# Patient Record
Sex: FEMALE | Race: BLACK OR AFRICAN AMERICAN | NOT HISPANIC OR LATINO | Employment: UNEMPLOYED | URBAN - METROPOLITAN AREA
[De-identification: names, ages, dates, MRNs, and addresses within clinical notes are randomized per-mention and may not be internally consistent; named-entity substitution may affect disease eponyms.]

---
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
## 2018-08-25 ENCOUNTER — HISTORICAL (OUTPATIENT)
Dept: LAB | Facility: HOSPITAL | Age: 18
End: 2018-08-25

## 2018-08-25 LAB
ABS NEUT (OLG): 3.23 X10(3)/MCL (ref 2.1–9.2)
BASOPHILS # BLD AUTO: 0.02 X10(3)/MCL (ref 0–0.2)
BASOPHILS NFR BLD AUTO: 0.4 % (ref 0–1)
EOSINOPHIL # BLD AUTO: 0.05 X10(3)/MCL (ref 0–0.9)
EOSINOPHIL NFR BLD AUTO: 0.9 % (ref 0–6.4)
ERYTHROCYTE [DISTWIDTH] IN BLOOD BY AUTOMATED COUNT: 14.4 % (ref 11.5–17)
GROUP & RH: NORMAL
HBV SURFACE AG SERPL QL IA: NEGATIVE
HCT VFR BLD AUTO: 32.8 % (ref 37–47)
HGB BLD-MCNC: 11 GM/DL (ref 12–16)
HIV 1+2 AB+HIV1 P24 AG SERPL QL IA: NEGATIVE
IMM GRANULOCYTES # BLD AUTO: 0.01 10*3/UL (ref 0–0.02)
IMM GRANULOCYTES NFR BLD AUTO: 0.2 % (ref 0–0.43)
LYMPHOCYTES # BLD AUTO: 1.96 X10(3)/MCL (ref 0.6–4.6)
LYMPHOCYTES NFR BLD AUTO: 35.2 % (ref 16–44)
MCH RBC QN AUTO: 26.9 PG (ref 27–31)
MCHC RBC AUTO-ENTMCNC: 33.5 GM/DL (ref 33–36)
MCV RBC AUTO: 80.2 FL (ref 80–94)
MONOCYTES # BLD AUTO: 0.3 X10(3)/MCL (ref 0.1–1.3)
MONOCYTES NFR BLD AUTO: 5.4 % (ref 4–12.1)
NEUTROPHILS # BLD AUTO: 3.23 X10(3)/MCL (ref 2.1–9.2)
NEUTROPHILS NFR BLD AUTO: 57.9 % (ref 43–73)
NRBC BLD AUTO-RTO: 0 % (ref 0–0.2)
PLATELET # BLD AUTO: 370 X10(3)/MCL (ref 130–400)
PMV BLD AUTO: 8.9 FL (ref 7.4–10.4)
RBC # BLD AUTO: 4.09 X10(6)/MCL (ref 4.2–5.4)
RPR SER QL: NORMAL
TSH SERPL-ACNC: 1.07 MIU/ML (ref 0.36–3.74)
WBC # SPEC AUTO: 5.6 X10(3)/MCL (ref 4.5–11.5)

## 2018-08-27 LAB — FINAL CULTURE: NO GROWTH

## 2018-11-05 ENCOUNTER — HISTORICAL (OUTPATIENT)
Dept: LAB | Facility: HOSPITAL | Age: 18
End: 2018-11-05

## 2018-12-21 ENCOUNTER — HOSPITAL ENCOUNTER (OUTPATIENT)
Dept: OBSTETRICS AND GYNECOLOGY | Facility: HOSPITAL | Age: 18
End: 2018-12-21
Attending: OBSTETRICS & GYNECOLOGY | Admitting: OBSTETRICS & GYNECOLOGY

## 2018-12-21 LAB
APPEARANCE, UA: CLEAR
BACTERIA SPEC CULT: ABNORMAL /HPF
BILIRUB UR QL STRIP: NEGATIVE
COLOR UR: YELLOW
GLUCOSE (UA): NEGATIVE
HGB UR QL STRIP: NEGATIVE
KETONES UR QL STRIP: ABNORMAL
LEUKOCYTE ESTERASE UR QL STRIP: ABNORMAL
NITRITE UR QL STRIP: NEGATIVE
PH UR STRIP: 8 [PH] (ref 5–9)
PROT UR QL STRIP: NEGATIVE
RBC #/AREA URNS HPF: ABNORMAL /[HPF]
SP GR UR STRIP: 1.01 (ref 1–1.03)
SQUAMOUS EPITHELIAL, UA: ABNORMAL
UROBILINOGEN UR STRIP-ACNC: 1
WBC #/AREA URNS HPF: 8 /HPF (ref 0–3)

## 2020-02-04 ENCOUNTER — HISTORICAL (OUTPATIENT)
Dept: LAB | Facility: HOSPITAL | Age: 20
End: 2020-02-04

## 2020-02-04 LAB
AT III ACT/NOR PPP CHRO: 98 % (ref 82–139)
INR PPP: 1
PROTHROMBIN TIME: 10.5 SECOND(S) (ref 8.6–10.1)

## 2021-04-12 ENCOUNTER — HISTORICAL (OUTPATIENT)
Dept: ADMINISTRATIVE | Facility: HOSPITAL | Age: 21
End: 2021-04-12

## 2021-04-30 ENCOUNTER — HISTORICAL (OUTPATIENT)
Dept: LAB | Facility: HOSPITAL | Age: 21
End: 2021-04-30

## 2021-04-30 LAB — GLUCOSE 1H P 100 G GLC PO SERPL-MCNC: 71 MG/DL (ref 100–180)

## 2021-05-14 ENCOUNTER — HISTORICAL (OUTPATIENT)
Dept: LAB | Facility: HOSPITAL | Age: 21
End: 2021-05-14

## 2021-05-14 LAB
CREAT SERPL-MCNC: 0.65 MG/DL (ref 0.55–1.02)
CREAT UR-MCNC: 95 MG/DL (ref 47–110)

## 2021-06-03 ENCOUNTER — HISTORICAL (OUTPATIENT)
Dept: LAB | Facility: HOSPITAL | Age: 21
End: 2021-06-03

## 2021-06-03 LAB
CREAT SERPL-MCNC: 0.6 MG/DL (ref 0.55–1.02)
CREAT UR-MCNC: 69 MG/DL (ref 47–110)
PROT 24H UR-MCNC: <176.8 MG/24HR (ref 0–165)

## 2022-12-24 ENCOUNTER — HOSPITAL ENCOUNTER (EMERGENCY)
Facility: HOSPITAL | Age: 22
Discharge: HOME | End: 2022-12-25
Attending: EMERGENCY MEDICINE
Payer: MEDICAID

## 2022-12-24 DIAGNOSIS — F41.9 ANXIETY: Primary | ICD-10-CM

## 2022-12-24 PROCEDURE — 96372 THER/PROPH/DIAG INJ SC/IM: CPT | Performed by: PHYSICIAN ASSISTANT

## 2022-12-24 PROCEDURE — 63600175 PHARM REV CODE 636 W HCPCS: Performed by: PHYSICIAN ASSISTANT

## 2022-12-24 PROCEDURE — 99284 EMERGENCY DEPT VISIT MOD MDM: CPT | Mod: 25

## 2022-12-24 RX ORDER — HYDROXYZINE PAMOATE 25 MG/1
25 CAPSULE ORAL
Status: DISCONTINUED | OUTPATIENT
Start: 2022-12-24 | End: 2022-12-24

## 2022-12-24 RX ORDER — METHOCARBAMOL 100 MG/ML
500 INJECTION, SOLUTION INTRAMUSCULAR; INTRAVENOUS
Status: COMPLETED | OUTPATIENT
Start: 2022-12-24 | End: 2022-12-24

## 2022-12-24 RX ORDER — DIPHENHYDRAMINE HYDROCHLORIDE 50 MG/ML
25 INJECTION INTRAMUSCULAR; INTRAVENOUS
Status: DISCONTINUED | OUTPATIENT
Start: 2022-12-24 | End: 2022-12-24

## 2022-12-24 RX ADMIN — METHOCARBAMOL 500 MG: 100 INJECTION, SOLUTION INTRAMUSCULAR; INTRAVENOUS at 10:12

## 2022-12-25 ENCOUNTER — HOSPITAL ENCOUNTER (EMERGENCY)
Facility: HOSPITAL | Age: 22
Discharge: HOME | End: 2022-12-25
Attending: FAMILY MEDICINE
Payer: MEDICAID

## 2022-12-25 VITALS
TEMPERATURE: 99 F | SYSTOLIC BLOOD PRESSURE: 121 MMHG | BODY MASS INDEX: 29.82 KG/M2 | RESPIRATION RATE: 18 BRPM | WEIGHT: 179 LBS | HEART RATE: 92 BPM | DIASTOLIC BLOOD PRESSURE: 76 MMHG | HEIGHT: 65 IN | OXYGEN SATURATION: 100 %

## 2022-12-25 VITALS
HEIGHT: 65 IN | RESPIRATION RATE: 18 BRPM | WEIGHT: 179 LBS | BODY MASS INDEX: 29.82 KG/M2 | TEMPERATURE: 98 F | SYSTOLIC BLOOD PRESSURE: 124 MMHG | OXYGEN SATURATION: 100 % | HEART RATE: 89 BPM | DIASTOLIC BLOOD PRESSURE: 76 MMHG

## 2022-12-25 DIAGNOSIS — F41.9 ANXIETY: Primary | ICD-10-CM

## 2022-12-25 LAB
ALBUMIN SERPL-MCNC: 3.6 G/DL (ref 3.5–5)
ALBUMIN/GLOB SERPL: 1 RATIO (ref 1.1–2)
ALP SERPL-CCNC: 107 UNIT/L (ref 40–150)
ALT SERPL-CCNC: 7 UNIT/L (ref 0–55)
APPEARANCE UR: CLEAR
AST SERPL-CCNC: 13 UNIT/L (ref 5–34)
BACTERIA #/AREA URNS AUTO: ABNORMAL /HPF
BASOPHILS # BLD AUTO: 0.03 X10(3)/MCL (ref 0–0.2)
BASOPHILS NFR BLD AUTO: 0.6 %
BILIRUB UR QL STRIP.AUTO: NEGATIVE MG/DL
BILIRUBIN DIRECT+TOT PNL SERPL-MCNC: 0.1 MG/DL
BUN SERPL-MCNC: 12.9 MG/DL (ref 7–18.7)
CALCIUM SERPL-MCNC: 8.7 MG/DL (ref 8.4–10.2)
CHLORIDE SERPL-SCNC: 108 MMOL/L (ref 98–107)
CO2 SERPL-SCNC: 21 MMOL/L (ref 22–29)
COLOR UR AUTO: ABNORMAL
CREAT SERPL-MCNC: 0.82 MG/DL (ref 0.55–1.02)
EOSINOPHIL # BLD AUTO: 0.2 X10(3)/MCL (ref 0–0.9)
EOSINOPHIL NFR BLD AUTO: 3.9 %
ERYTHROCYTE [DISTWIDTH] IN BLOOD BY AUTOMATED COUNT: 14.6 % (ref 11–14.5)
GFR SERPLBLD CREATININE-BSD FMLA CKD-EPI: >60 MLS/MIN/1.73/M2
GLOBULIN SER-MCNC: 3.6 GM/DL (ref 2.4–3.5)
GLUCOSE SERPL-MCNC: 89 MG/DL (ref 74–100)
GLUCOSE UR QL STRIP.AUTO: NORMAL MG/DL
HCT VFR BLD AUTO: 33.7 % (ref 37–47)
HGB BLD-MCNC: 10.8 GM/DL (ref 12–16)
HYALINE CASTS #/AREA URNS LPF: ABNORMAL /LPF
IMM GRANULOCYTES # BLD AUTO: 0.01 X10(3)/MCL (ref 0–0.04)
IMM GRANULOCYTES NFR BLD AUTO: 0.2 %
KETONES UR QL STRIP.AUTO: NEGATIVE MG/DL
LEUKOCYTE ESTERASE UR QL STRIP.AUTO: 500 UNIT/L
LYMPHOCYTES # BLD AUTO: 2.61 X10(3)/MCL (ref 0.6–4.6)
LYMPHOCYTES NFR BLD AUTO: 50.9 %
MAGNESIUM SERPL-MCNC: 1.6 MG/DL (ref 1.6–2.6)
MCH RBC QN AUTO: 25.8 PG
MCHC RBC AUTO-ENTMCNC: 32 MG/DL (ref 33–36)
MCV RBC AUTO: 80.6 FL (ref 80–94)
MONOCYTES # BLD AUTO: 0.45 X10(3)/MCL (ref 0.1–1.3)
MONOCYTES NFR BLD AUTO: 8.8 %
MUCOUS THREADS URNS QL MICRO: ABNORMAL /LPF
NEUTROPHILS # BLD AUTO: 1.83 X10(3)/MCL (ref 2.1–9.2)
NEUTROPHILS NFR BLD AUTO: 35.6 %
NITRITE UR QL STRIP.AUTO: NEGATIVE
NRBC BLD AUTO-RTO: 0 % (ref 0–1)
PH UR STRIP.AUTO: 6.5 [PH]
PLATELET # BLD AUTO: 371 X10(3)/MCL (ref 140–371)
PMV BLD AUTO: 9.2 FL (ref 9.4–12.4)
POTASSIUM SERPL-SCNC: 3.9 MMOL/L (ref 3.5–5.1)
PROT SERPL-MCNC: 7.2 GM/DL (ref 6.4–8.3)
PROT UR QL STRIP.AUTO: NEGATIVE MG/DL
RBC # BLD AUTO: 4.18 X10(6)/MCL (ref 4.2–5.4)
RBC #/AREA URNS AUTO: ABNORMAL /HPF
RBC UR QL AUTO: NEGATIVE UNIT/L
SODIUM SERPL-SCNC: 138 MMOL/L (ref 136–145)
SP GR UR STRIP.AUTO: 1.01
SQUAMOUS #/AREA URNS LPF: ABNORMAL /HPF
UROBILINOGEN UR STRIP-ACNC: NORMAL MG/DL
WBC # SPEC AUTO: 5.1 X10(3)/MCL (ref 4.5–11.5)
WBC #/AREA URNS AUTO: ABNORMAL /HPF

## 2022-12-25 PROCEDURE — 99284 EMERGENCY DEPT VISIT MOD MDM: CPT | Mod: 25

## 2022-12-25 PROCEDURE — 96361 HYDRATE IV INFUSION ADD-ON: CPT

## 2022-12-25 PROCEDURE — 63600175 PHARM REV CODE 636 W HCPCS: Performed by: FAMILY MEDICINE

## 2022-12-25 PROCEDURE — 96374 THER/PROPH/DIAG INJ IV PUSH: CPT

## 2022-12-25 PROCEDURE — 80053 COMPREHEN METABOLIC PANEL: CPT | Performed by: FAMILY MEDICINE

## 2022-12-25 PROCEDURE — 87077 CULTURE AEROBIC IDENTIFY: CPT | Performed by: FAMILY MEDICINE

## 2022-12-25 PROCEDURE — 83735 ASSAY OF MAGNESIUM: CPT | Performed by: FAMILY MEDICINE

## 2022-12-25 PROCEDURE — 81001 URINALYSIS AUTO W/SCOPE: CPT | Performed by: FAMILY MEDICINE

## 2022-12-25 PROCEDURE — 25000003 PHARM REV CODE 250: Performed by: FAMILY MEDICINE

## 2022-12-25 PROCEDURE — 85025 COMPLETE CBC W/AUTO DIFF WBC: CPT | Performed by: FAMILY MEDICINE

## 2022-12-25 RX ORDER — SERTRALINE HYDROCHLORIDE 25 MG/1
25 TABLET, FILM COATED ORAL DAILY
COMMUNITY
Start: 2022-10-19

## 2022-12-25 RX ORDER — LORAZEPAM 2 MG/ML
1 INJECTION INTRAMUSCULAR
Status: COMPLETED | OUTPATIENT
Start: 2022-12-25 | End: 2022-12-25

## 2022-12-25 RX ORDER — HYDROXYZINE PAMOATE 25 MG/1
25 CAPSULE ORAL EVERY 6 HOURS PRN
Qty: 40 CAPSULE | Refills: 0 | Status: SHIPPED | OUTPATIENT
Start: 2022-12-25 | End: 2023-01-04

## 2022-12-25 RX ADMIN — SODIUM CHLORIDE 1000 ML: 9 INJECTION, SOLUTION INTRAVENOUS at 08:12

## 2022-12-25 RX ADMIN — LORAZEPAM 1 MG: 2 INJECTION INTRAMUSCULAR; INTRAVENOUS at 08:12

## 2022-12-25 NOTE — DISCHARGE INSTRUCTIONS
Stay well hydrated and drinks lots of water. Return with any concerning symptoms.  Follow up with primary care provider within 3-5 days.

## 2022-12-25 NOTE — Clinical Note
"Demond Aquino" Moise was seen and treated in our emergency department on 12/25/2022.  She may return to work on 12/26/2022.       If you have any questions or concerns, please don't hesitate to call.      DYLAN escobar RN    "

## 2022-12-25 NOTE — ED PROVIDER NOTES
Encounter Date: 12/24/2022       History     Chief Complaint   Patient presents with    Anxiety     Pt reports taking two synthetic THC gas station gummies tonight and is now very anxious, she reports she has never taken any kind of illicit substance before.     Tachycardia     Patient is a 21 year old female who presents to the emergency department feeling anxious and shaking after she took two synthetic THC gummies from a gas station earlier tonight.  She states this is the first time she has taken an illicit substance before.  She denies chest pain, SOB, abdominal pain, nausea, vomiting, dizziness, vision changes.      The history is provided by the patient. No  was used.   Review of patient's allergies indicates:   Allergen Reactions    Amoxicillin Hives     History reviewed. No pertinent past medical history.  History reviewed. No pertinent surgical history.  History reviewed. No pertinent family history.  Social History     Tobacco Use    Smoking status: Never    Smokeless tobacco: Never   Substance Use Topics    Alcohol use: Yes     Comment: twice a month/socially     Review of Systems   Constitutional:  Negative for chills and fever.   HENT: Negative.     Eyes: Negative.    Respiratory:  Negative for cough, chest tightness and shortness of breath.    Cardiovascular:  Negative for chest pain and palpitations.        Fast heart rate   Gastrointestinal:  Negative for abdominal pain, diarrhea, nausea and vomiting.   Musculoskeletal:  Negative for back pain and neck pain.   Skin:  Negative for color change, rash and wound.   Neurological:  Negative for dizziness, syncope, weakness, light-headedness and numbness.   Hematological:  Negative for adenopathy. Does not bruise/bleed easily.   Psychiatric/Behavioral:  The patient is nervous/anxious.      Physical Exam     Initial Vitals [12/24/22 2149]   BP Pulse Resp Temp SpO2   109/61 (!) 130 20 98.4 °F (36.9 °C) 100 %      MAP       --          Physical Exam    Nursing note and vitals reviewed.  Constitutional: She appears well-developed and well-nourished.   Shaking and anxious    HENT:   Head: Normocephalic and atraumatic.   Nose: Nose normal.   Mouth/Throat: Oropharynx is clear and moist.   Eyes: Conjunctivae and EOM are normal. Pupils are equal, round, and reactive to light.   Neck: Neck supple.   Normal range of motion.  Cardiovascular:  Normal rate, normal heart sounds and intact distal pulses.           Pulmonary/Chest: Breath sounds normal. No respiratory distress.   Abdominal: Abdomen is soft. Bowel sounds are normal. There is no abdominal tenderness.   Musculoskeletal:         General: Normal range of motion.      Cervical back: Normal range of motion and neck supple.     Neurological: She is alert and oriented to person, place, and time. She has normal strength. GCS score is 15. GCS eye subscore is 4. GCS verbal subscore is 5. GCS motor subscore is 6.   Skin: Skin is warm. Capillary refill takes less than 2 seconds.   Psychiatric: Her mood appears anxious.       ED Course   Procedures  Labs Reviewed - No data to display       Imaging Results    None          Medications   methocarbamoL injection 500 mg (500 mg Intramuscular Given 12/24/22 2227)     Medical Decision Making:   ED Management:  The patient is resting comfortably and in no acute distress.  She states that her symptoms have improved after treatment in Emergency Department. I personally discussed the importance of not using the substances she consumed tonight and discussed treatment plan.  Gave strict ED precautions, discussed specific conditions for return to the emergency department and importance of follow up with her primary care provider.  Patient voices understanding and agrees to the plan discussed. All patients' questions have been answered at this time.   She has remained hemodynamically stable throughout entire stay in ED and is stable for discharge home.                           Clinical Impression:   Final diagnoses:  [F41.9] Anxiety (Primary)        ED Disposition Condition    Discharge Stable          ED Prescriptions    None       Follow-up Information       Follow up With Specialties Details Why Contact Info    [facility] - Emergency Dept Emergency Medicine  As needed, If symptoms worsen [address] [facility] [id number] [phone]             ELENA Walker  12/25/22 5532

## 2022-12-26 ENCOUNTER — HOSPITAL ENCOUNTER (EMERGENCY)
Facility: HOSPITAL | Age: 22
Discharge: HOME | End: 2022-12-26
Attending: EMERGENCY MEDICINE
Payer: MEDICAID

## 2022-12-26 VITALS
WEIGHT: 179 LBS | RESPIRATION RATE: 18 BRPM | HEIGHT: 65 IN | DIASTOLIC BLOOD PRESSURE: 68 MMHG | SYSTOLIC BLOOD PRESSURE: 124 MMHG | OXYGEN SATURATION: 98 % | BODY MASS INDEX: 29.82 KG/M2 | HEART RATE: 88 BPM

## 2022-12-26 DIAGNOSIS — F41.9 ANXIETY: Primary | ICD-10-CM

## 2022-12-26 LAB
ALBUMIN SERPL-MCNC: 3.9 G/DL (ref 3.5–5)
ALBUMIN/GLOB SERPL: 1.2 RATIO (ref 1.1–2)
ALP SERPL-CCNC: 120 UNIT/L (ref 40–150)
ALT SERPL-CCNC: 9 UNIT/L (ref 0–55)
AMPHET UR QL SCN: NEGATIVE
AST SERPL-CCNC: 15 UNIT/L (ref 5–34)
B-HCG SERPL QL: NEGATIVE
BARBITURATE SCN PRESENT UR: NEGATIVE
BASOPHILS # BLD AUTO: 0.03 X10(3)/MCL (ref 0–0.2)
BASOPHILS NFR BLD AUTO: 0.4 %
BENZODIAZ UR QL SCN: NEGATIVE
BILIRUBIN DIRECT+TOT PNL SERPL-MCNC: 0.3 MG/DL
BUN SERPL-MCNC: 9.8 MG/DL (ref 7–18.7)
CALCIUM SERPL-MCNC: 9.3 MG/DL (ref 8.4–10.2)
CANNABINOIDS UR QL SCN: POSITIVE
CHLORIDE SERPL-SCNC: 108 MMOL/L (ref 98–107)
CK SERPL-CCNC: 199 U/L (ref 29–168)
CO2 SERPL-SCNC: 18 MMOL/L (ref 22–29)
COCAINE UR QL SCN: NEGATIVE
CREAT SERPL-MCNC: 0.77 MG/DL (ref 0.55–1.02)
EOSINOPHIL # BLD AUTO: 0.22 X10(3)/MCL (ref 0–0.9)
EOSINOPHIL NFR BLD AUTO: 2.8 %
ERYTHROCYTE [DISTWIDTH] IN BLOOD BY AUTOMATED COUNT: 14.6 % (ref 11–14.5)
FLUAV AG UPPER RESP QL IA.RAPID: NOT DETECTED
FLUBV AG UPPER RESP QL IA.RAPID: NOT DETECTED
GFR SERPLBLD CREATININE-BSD FMLA CKD-EPI: >60 MLS/MIN/1.73/M2
GLOBULIN SER-MCNC: 3.3 GM/DL (ref 2.4–3.5)
GLUCOSE SERPL-MCNC: 88 MG/DL (ref 74–100)
HCT VFR BLD AUTO: 35 % (ref 37–47)
HGB BLD-MCNC: 11.2 GM/DL (ref 12–16)
IMM GRANULOCYTES # BLD AUTO: 0.01 X10(3)/MCL (ref 0–0.04)
IMM GRANULOCYTES NFR BLD AUTO: 0.1 %
LYMPHOCYTES # BLD AUTO: 3.19 X10(3)/MCL (ref 0.6–4.6)
LYMPHOCYTES NFR BLD AUTO: 41 %
MAGNESIUM SERPL-MCNC: 1.7 MG/DL (ref 1.6–2.6)
MCH RBC QN AUTO: 25.9 PG
MCHC RBC AUTO-ENTMCNC: 32 MG/DL (ref 33–36)
MCV RBC AUTO: 81 FL (ref 80–94)
MDMA UR QL SCN: NEGATIVE
MONOCYTES # BLD AUTO: 0.53 X10(3)/MCL (ref 0.1–1.3)
MONOCYTES NFR BLD AUTO: 6.8 %
NEUTROPHILS # BLD AUTO: 3.8 X10(3)/MCL (ref 2.1–9.2)
NEUTROPHILS NFR BLD AUTO: 48.9 %
NRBC BLD AUTO-RTO: 0 % (ref 0–1)
OPIATES UR QL SCN: NEGATIVE
PCP UR QL: NEGATIVE
PH UR: 7 [PH] (ref 3–11)
PLATELET # BLD AUTO: 390 X10(3)/MCL (ref 140–371)
PMV BLD AUTO: 9.3 FL (ref 9.4–12.4)
POTASSIUM SERPL-SCNC: 3.8 MMOL/L (ref 3.5–5.1)
PROT SERPL-MCNC: 7.2 GM/DL (ref 6.4–8.3)
RBC # BLD AUTO: 4.32 X10(6)/MCL (ref 4.2–5.4)
SARS-COV-2 RNA RESP QL NAA+PROBE: NOT DETECTED
SODIUM SERPL-SCNC: 139 MMOL/L (ref 136–145)
TROPONIN I SERPL-MCNC: <0.01 NG/ML (ref 0–0.04)
WBC # SPEC AUTO: 7.8 X10(3)/MCL (ref 4.5–11.5)

## 2022-12-26 PROCEDURE — 99284 EMERGENCY DEPT VISIT MOD MDM: CPT | Mod: 25

## 2022-12-26 PROCEDURE — 63600175 PHARM REV CODE 636 W HCPCS: Performed by: EMERGENCY MEDICINE

## 2022-12-26 PROCEDURE — 84484 ASSAY OF TROPONIN QUANT: CPT | Performed by: EMERGENCY MEDICINE

## 2022-12-26 PROCEDURE — 0240U COVID/FLU A&B PCR: CPT | Performed by: EMERGENCY MEDICINE

## 2022-12-26 PROCEDURE — 80053 COMPREHEN METABOLIC PANEL: CPT | Performed by: EMERGENCY MEDICINE

## 2022-12-26 PROCEDURE — 82550 ASSAY OF CK (CPK): CPT | Performed by: EMERGENCY MEDICINE

## 2022-12-26 PROCEDURE — 25000003 PHARM REV CODE 250: Performed by: EMERGENCY MEDICINE

## 2022-12-26 PROCEDURE — 80307 DRUG TEST PRSMV CHEM ANLYZR: CPT | Performed by: EMERGENCY MEDICINE

## 2022-12-26 PROCEDURE — 85025 COMPLETE CBC W/AUTO DIFF WBC: CPT | Performed by: EMERGENCY MEDICINE

## 2022-12-26 PROCEDURE — 96372 THER/PROPH/DIAG INJ SC/IM: CPT | Mod: 59 | Performed by: EMERGENCY MEDICINE

## 2022-12-26 PROCEDURE — 81025 URINE PREGNANCY TEST: CPT | Performed by: EMERGENCY MEDICINE

## 2022-12-26 PROCEDURE — 96360 HYDRATION IV INFUSION INIT: CPT

## 2022-12-26 PROCEDURE — 83735 ASSAY OF MAGNESIUM: CPT | Performed by: EMERGENCY MEDICINE

## 2022-12-26 RX ORDER — LORAZEPAM 1 MG/1
0.5 TABLET ORAL EVERY 6 HOURS PRN
Qty: 10 TABLET | Refills: 0 | Status: SHIPPED | OUTPATIENT
Start: 2022-12-26 | End: 2023-01-25

## 2022-12-26 RX ORDER — HALOPERIDOL 5 MG/ML
2.5 INJECTION INTRAMUSCULAR
Status: COMPLETED | OUTPATIENT
Start: 2022-12-26 | End: 2022-12-26

## 2022-12-26 RX ORDER — LORAZEPAM 1 MG/1
2 TABLET ORAL
Status: COMPLETED | OUTPATIENT
Start: 2022-12-26 | End: 2022-12-26

## 2022-12-26 RX ORDER — SODIUM CHLORIDE 9 MG/ML
1000 INJECTION, SOLUTION INTRAVENOUS
Status: COMPLETED | OUTPATIENT
Start: 2022-12-26 | End: 2022-12-26

## 2022-12-26 RX ADMIN — SODIUM CHLORIDE 1000 ML: 9 INJECTION, SOLUTION INTRAVENOUS at 03:12

## 2022-12-26 RX ADMIN — HALOPERIDOL LACTATE 2.5 MG: 5 INJECTION, SOLUTION INTRAMUSCULAR at 02:12

## 2022-12-26 RX ADMIN — LORAZEPAM 2 MG: 1 TABLET ORAL at 02:12

## 2022-12-26 NOTE — ED PROVIDER NOTES
Encounter Date: 12/25/2022       History     Chief Complaint   Patient presents with    Panic Attack     Pt reports having a panic attack starting prior to arrival, she reports she had one last night, was seen here, improved and then had another one this evening. Uncertain of causation. Reports taking her prescribed dose of zoloft  60mg around 1500 hrs today. Vss.      21-year-old female presents emergency room with complaints of acute anxiety.  Patient reports being seen in the emergency room yesterday with anxiety after reportedly eating a marijuana gummy.  Patient reports feeling fine this morning, but began having symptoms approximately 1 hour ago (7:00 p.m.) where she began shaking uncontrollably.  Denies shortness of breath or chest pain.  Symptoms moderate, nothing makes better or worse.    The history is provided by the patient.   Review of patient's allergies indicates:   Allergen Reactions    Amoxicillin Hives     History reviewed. No pertinent past medical history.  History reviewed. No pertinent surgical history.  History reviewed. No pertinent family history.  Social History     Tobacco Use    Smoking status: Never    Smokeless tobacco: Never   Substance Use Topics    Alcohol use: Yes     Comment: twice a month/socially     Review of Systems   Constitutional:  Negative for chills, fatigue and fever.   HENT:  Negative for ear pain, rhinorrhea and sore throat.    Eyes:  Negative for photophobia and pain.   Respiratory:  Negative for cough, shortness of breath and wheezing.    Cardiovascular:  Negative for chest pain.   Gastrointestinal:  Negative for abdominal pain, diarrhea, nausea and vomiting.   Genitourinary:  Negative for dysuria.   Neurological:  Negative for dizziness, weakness and headaches.   All other systems reviewed and are negative.    Physical Exam     Initial Vitals [12/25/22 2013]   BP Pulse Resp Temp SpO2   123/70 105 19 99 °F (37.2 °C) 99 %      MAP       --         Physical  Exam    Nursing note and vitals reviewed.  Constitutional: She appears well-developed and well-nourished. No distress.   HENT:   Head: Normocephalic and atraumatic.   Eyes: Conjunctivae and EOM are normal. Pupils are equal, round, and reactive to light.   Neck: Neck supple.   Normal range of motion.  Cardiovascular:  Normal rate, regular rhythm, normal heart sounds and intact distal pulses.           Pulmonary/Chest: Breath sounds normal. No respiratory distress. She has no wheezes. She has no rhonchi. She has no rales.   Abdominal: Abdomen is soft. Bowel sounds are normal. She exhibits no distension. There is no abdominal tenderness. There is no rebound and no guarding.   Musculoskeletal:         General: Normal range of motion.      Cervical back: Normal range of motion and neck supple.      Comments: Patient with generalized tremors of upper and lower extremities.  No muscle rigidity appreciated.  No hyperreflexia.     Neurological: She is alert and oriented to person, place, and time. GCS score is 15. GCS eye subscore is 4. GCS verbal subscore is 5. GCS motor subscore is 6.   Skin: Skin is warm and dry. Capillary refill takes less than 2 seconds. No erythema.   Psychiatric: She has a normal mood and affect. Her behavior is normal. Judgment and thought content normal.       ED Course   Procedures  Labs Reviewed   COMPREHENSIVE METABOLIC PANEL - Abnormal; Notable for the following components:       Result Value    Chloride 108 (*)     Carbon Dioxide 21 (*)     Globulin 3.6 (*)     Albumin/Globulin Ratio 1.0 (*)     All other components within normal limits   CBC WITH DIFFERENTIAL - Abnormal; Notable for the following components:    RBC 4.18 (*)     Hgb 10.8 (*)     Hct 33.7 (*)     MCHC 32.0 (*)     RDW 14.6 (*)     MPV 9.2 (*)     Neut # 1.83 (*)     All other components within normal limits   MAGNESIUM - Normal   CBC W/ AUTO DIFFERENTIAL    Narrative:     The following orders were created for panel order CBC  Auto Differential.  Procedure                               Abnormality         Status                     ---------                               -----------         ------                     CBC with Differential[[id number]]        Abnormal            Final result                 Please view results for these tests on the individual orders.   URINALYSIS, REFLEX TO URINE CULTURE   EXTRA TUBES    Narrative:     The following orders were created for panel order EXTRA TUBES.  Procedure                               Abnormality         Status                     ---------                               -----------         ------                     Light Blue Top Hold[[id number]]                              In process                 Red Top Hold[[id number]]                                     In process                 Light Green Top Hold[[id number]]                             In process                 Lavender Top Hold[[id number]]                                In process                   Please view results for these tests on the individual orders.   LIGHT BLUE TOP HOLD   RED TOP HOLD   LIGHT GREEN TOP HOLD   LAVENDER TOP HOLD          Imaging Results    None          Medications   sodium chloride 0.9% bolus 1,000 mL 1,000 mL (0 mLs Intravenous Stopped 12/25/22 2138)   LORazepam injection 1 mg (1 mg Intravenous Given 12/25/22 2038)     Medical Decision Making:   Differential Diagnosis:   Electrolyte abnormality, serotonin syndrome, generalized anxiety           ED Course as of 12/25/22 2140   Sun Dec 25, 2022   2057 WBC: 5.1 [MW]   2057 HEMOGLOBIN(!): 10.8 [MW]   2057 HEMATOCRIT(!): 33.7 [MW]   2057 Platelets: 371 [MW]   2135 Sodium: 138 [MW]   2135 Potassium: 3.9 [MW]   2136 Chloride(!): 108 [MW]   2136 CO2(!): 21 [MW]   2136 Glucose: 89 [MW]   2136 BUN: 12.9 [MW]   2136 Creatinine: 0.82 [MW]   2136 Calcium: 8.7 [MW]   2136 PROTEIN TOTAL: 7.2 [MW]   2136 Albumin: 3.6 [MW]   2139 Feeling improved.  Stable for  discharge home.  ER precautions given for any acute worsening. [MW]      ED Course User Index  [MW] Blair Medina MD                 Clinical Impression:   Final diagnoses:  [F41.9] Anxiety (Primary)        ED Disposition Condition    Discharge Stable          ED Prescriptions       Medication Sig Dispense Start Date End Date Auth. Provider    hydrOXYzine pamoate (VISTARIL) 25 MG Cap Take 1 capsule (25 mg total) by mouth every 6 (six) hours as needed (Anxiety). 40 capsule 12/25/2022 1/4/2023 Blair Medina MD          Follow-up Information       Follow up With Specialties Details Why Contact Info    [facility] - Emergency Dept Emergency Medicine  As needed, If symptoms worsen [address] [facility] [address] [phone]    Primary Care Physician  In 5 days               Blair Medina MD  12/25/22 4242

## 2022-12-26 NOTE — Clinical Note
HARLEY BAILEY accompanied their spouse to the emergency department on 12/26/2022. They may return to work on 12/28/2022.      If you have any questions or concerns, please don't hesitate to call.       RN

## 2022-12-26 NOTE — Clinical Note
"Demond Mcintyrena Phipps was seen and treated in our emergency department on 12/26/2022.  She may return to work on 12/27/2022.       If you have any questions or concerns, please don't hesitate to call.      Lexis Campo MD"

## 2022-12-27 NOTE — ED PROVIDER NOTES
Encounter Date: 12/26/2022       History     Chief Complaint   Patient presents with    Anxiety      REPORTS WIFE HAVING AN ANXIETY ATTACK; PT LYING ON STRETCHER ANSWERING QUESTIONS AND SHAKING; DENIES SI; DENEIS HI; REPORTS BEING SEEN AT [facility] EARLIER TODAY AND DISCAHRGED; REPORTS BEING SEEN AT [facility] TWICE IN 2 DAYS;      Patient is a 22 yo F presenting with body shaking, anxiety, and cramping in her muscles. Earlier today symptoms started. This has happened before over the past two days after taking a cannabis gummy. Was seen at two ED, had resolution of symptoms but then started again prior to arrival. No syncope. No chest pain. No shortness of breath. She is having body aches from the tensing, shaking, and body cramps. Has a hx of panic attacks but typically aren't this severe. No fevers. No one else having symptoms at home. She denies any HI/SI.      Review of patient's allergies indicates:   Allergen Reactions    Amoxicillin Hives     No past medical history on file.  No past surgical history on file.  No family history on file.  Social History     Tobacco Use    Smoking status: Never    Smokeless tobacco: Never   Substance Use Topics    Alcohol use: Yes     Comment: twice a month/socially     Review of Systems   Constitutional:  Positive for chills and fatigue. Negative for fever.   HENT:  Negative for sore throat.    Respiratory:  Negative for shortness of breath.    Cardiovascular:  Negative for chest pain.   Gastrointestinal:  Negative for nausea.   Genitourinary:  Negative for dysuria.   Musculoskeletal:  Negative for back pain.   Skin:  Negative for rash.   Neurological:  Positive for tremors. Negative for weakness and numbness.   Hematological:  Does not bruise/bleed easily.   Psychiatric/Behavioral:  Positive for agitation. Negative for confusion, hallucinations and suicidal ideas. The patient is nervous/anxious.      Physical Exam     Initial Vitals [12/26/22 0109]   BP Pulse Resp Temp SpO2    122/82 103 20 -- 100 %      MAP       --         Physical Exam    Nursing note and vitals reviewed.  Constitutional: She appears well-developed and well-nourished. No distress.   HENT:   Head: Normocephalic and atraumatic.   Eyes: Conjunctivae are normal. Pupils are equal, round, and reactive to light.   Neck: Neck supple.   Cardiovascular:  Normal rate, regular rhythm and normal heart sounds.           No murmur heard.  Pulmonary/Chest: Breath sounds normal. No respiratory distress. She has no wheezes. She has no rhonchi.   Abdominal: Abdomen is soft. Bowel sounds are normal. She exhibits no distension. There is no abdominal tenderness. There is no rebound and no guarding.   Musculoskeletal:         General: No edema. Normal range of motion.      Cervical back: Neck supple.     Neurological: She is alert and oriented to person, place, and time.   Skin: Skin is warm and dry.   Psychiatric: Thought content normal.   Patient appears to be having a panic attack. She is hyperventilating and having full body trembling and tensing. She is awake and alert. No seizure like activity.       ED Course   Procedures  Labs Reviewed   COMPREHENSIVE METABOLIC PANEL - Abnormal; Notable for the following components:       Result Value    Chloride 108 (*)     Carbon Dioxide 18 (*)     All other components within normal limits   DRUG SCREEN, URINE (BEAKER) - Abnormal; Notable for the following components:    Cannabinoids, Urine Positive (*)     All other components within normal limits    Narrative:     Cut off concentrations:    Amphetamines - 1000 ng/ml  Barbiturates - 200 ng/ml  Benzodiazepine - 200 ng/ml  Cannabinoids (THC) - 50 ng/ml  Cocaine - 300 ng/ml  Fentanyl - 1.0 ng/ml  MDMA - 500 ng/ml  Opiates - 300 ng/ml   Phencyclidine (PCP) - 25 ng/ml    Specimen submitted for drug analysis and tested for pH and specific gravity in order to evaluate sample integrity. Suspect tampering if specific gravity is <1.003 and/or pH is not  within the range of 4.5 - 8.0  False negatives may result form substances such as bleach added to urine.  False positives may result for the presence of a substance with similar chemical structure to the drug or its metabolite.    This test provides only a PRELIMINARY analytical test result. A more specific alternate chemical method must be used in order to obtain a confirmed analytical result. Gas chromatography/mass spectrometry (GC/MS) is the preferred confirmatory method. Other chemical confirmation methods are available. Clinical consideration and professional judgement should be applied to any drug of abuse test result, particularly when preliminary positive results are used.    Positive results will be confirmed only at the physicians request. Unconfirmed screening results are to be used only for medical purposes (treatment).        CK - Abnormal; Notable for the following components:    Creatine Kinase 199 (*)     All other components within normal limits   CBC WITH DIFFERENTIAL - Abnormal; Notable for the following components:    Hgb 11.2 (*)     Hct 35.0 (*)     MCHC 32.0 (*)     RDW 14.6 (*)     Platelet 390 (*)     MPV 9.3 (*)     All other components within normal limits   MAGNESIUM - Normal   PREGNANCY TEST, URINE RAPID - Normal   TROPONIN I - Normal   COVID/FLU A&B PCR - Normal    Narrative:     The Xpert Xpress SARS-CoV-2/FLU/RSV plus is a rapid, multiplexed real-time PCR test intended for the simultaneous qualitative detection and differentiation of SARS-CoV-2, Influenza A, Influenza B, and respiratory syncytial virus (RSV) viral RNA in either nasopharyngeal swab or nasal swab specimens.         CBC W/ AUTO DIFFERENTIAL    Narrative:     The following orders were created for panel order CBC auto differential.  Procedure                               Abnormality         Status                     ---------                               -----------         ------                     CBC with  Differential[[id number]]        Abnormal            Final result                 Please view results for these tests on the individual orders.          Imaging Results    None          Medications   LORazepam tablet 2 mg (2 mg Oral Given 12/26/22 0214)   haloperidol lactate injection 2.5 mg (2.5 mg Intramuscular Given 12/26/22 0252)   0.9%  NaCl infusion (0 mLs Intravenous Stopped 12/26/22 0359)                 ED Course as of 12/27/22 0648   Mon Dec 26, 2022   0339 Re-evaluate after fluids [GM]   0354 Patient feeling better, resting comfortably [GM]      ED Course User Index  [GM] Lexis Campo MD                 Clinical Impression:   Final diagnoses:  [F41.9] Anxiety (Primary)        ED Disposition Condition    Discharge Stable          ED Prescriptions       Medication Sig Dispense Start Date End Date Auth. Provider    LORazepam (ATIVAN) 1 MG tablet Take 0.5 tablets (0.5 mg total) by mouth every 6 (six) hours as needed for Anxiety. 10 tablet 12/26/2022 1/25/2023 Lexis Campo MD          Follow-up Information       Follow up With Specialties Details Why Contact Info    Annita Mejia, [facility] In 2 days If symptoms worsen, return to the ED [address] [facility] [address] [phone]               Lexis Campo MD  12/27/22 4752

## 2022-12-28 LAB — BACTERIA UR CULT: ABNORMAL

## 2024-01-30 DIAGNOSIS — O09.299 PREGNANCY WITH POOR OBSTETRIC HISTORY: Primary | ICD-10-CM

## 2024-02-09 DIAGNOSIS — O09.291 PRIOR POOR OBSTETRICAL HISTORY, ANTEPARTUM, FIRST TRIMESTER: Primary | ICD-10-CM

## 2024-02-12 ENCOUNTER — PROCEDURE VISIT (OUTPATIENT)
Dept: MATERNAL FETAL MEDICINE | Facility: CLINIC | Age: 24
End: 2024-02-12
Payer: MEDICAID

## 2024-02-12 ENCOUNTER — OFFICE VISIT (OUTPATIENT)
Dept: MATERNAL FETAL MEDICINE | Facility: CLINIC | Age: 24
End: 2024-02-12
Payer: MEDICAID

## 2024-02-12 VITALS
HEART RATE: 70 BPM | BODY MASS INDEX: 32.26 KG/M2 | SYSTOLIC BLOOD PRESSURE: 107 MMHG | HEIGHT: 65 IN | DIASTOLIC BLOOD PRESSURE: 74 MMHG | WEIGHT: 193.63 LBS

## 2024-02-12 DIAGNOSIS — O98.511 HERPES SIMPLEX VIRUS TYPE 2 (HSV-2) INFECTION AFFECTING PREGNANCY IN FIRST TRIMESTER: ICD-10-CM

## 2024-02-12 DIAGNOSIS — O09.299 PREGNANCY WITH POOR OBSTETRIC HISTORY: ICD-10-CM

## 2024-02-12 DIAGNOSIS — B00.9 HERPES SIMPLEX VIRUS TYPE 2 (HSV-2) INFECTION AFFECTING PREGNANCY IN FIRST TRIMESTER: ICD-10-CM

## 2024-02-12 DIAGNOSIS — O99.211 OBESITY AFFECTING PREGNANCY IN FIRST TRIMESTER, UNSPECIFIED OBESITY TYPE: ICD-10-CM

## 2024-02-12 DIAGNOSIS — O09.299 HX OF INTRAUTERINE GROWTH RESTRICTION IN PRIOR PREGNANCY, CURRENTLY PREGNANT: ICD-10-CM

## 2024-02-12 DIAGNOSIS — O09.291 PRIOR PREGNANCY WITH FETAL DEMISE AND CURRENT PREGNANCY IN FIRST TRIMESTER: Primary | ICD-10-CM

## 2024-02-12 DIAGNOSIS — O09.90 AT HIGH RISK FOR COMPLICATIONS OF INTRAUTERINE PREGNANCY (IUP): ICD-10-CM

## 2024-02-12 DIAGNOSIS — O09.291 PRIOR POOR OBSTETRICAL HISTORY, ANTEPARTUM, FIRST TRIMESTER: ICD-10-CM

## 2024-02-12 PROCEDURE — 3074F SYST BP LT 130 MM HG: CPT | Mod: CPTII,S$GLB,, | Performed by: OBSTETRICS & GYNECOLOGY

## 2024-02-12 PROCEDURE — 76801 OB US < 14 WKS SINGLE FETUS: CPT | Mod: S$GLB,,, | Performed by: OBSTETRICS & GYNECOLOGY

## 2024-02-12 PROCEDURE — 1159F MED LIST DOCD IN RCRD: CPT | Mod: CPTII,S$GLB,, | Performed by: OBSTETRICS & GYNECOLOGY

## 2024-02-12 PROCEDURE — 3008F BODY MASS INDEX DOCD: CPT | Mod: CPTII,S$GLB,, | Performed by: OBSTETRICS & GYNECOLOGY

## 2024-02-12 PROCEDURE — 3078F DIAST BP <80 MM HG: CPT | Mod: CPTII,S$GLB,, | Performed by: OBSTETRICS & GYNECOLOGY

## 2024-02-12 PROCEDURE — 99203 OFFICE O/P NEW LOW 30 MIN: CPT | Mod: TH,S$GLB,, | Performed by: OBSTETRICS & GYNECOLOGY

## 2024-02-12 RX ORDER — ONDANSETRON 4 MG/1
4 TABLET, FILM COATED ORAL EVERY 8 HOURS PRN
COMMUNITY
Start: 2024-01-30 | End: 2024-05-30

## 2024-02-12 RX ORDER — ASPIRIN 81 MG/1
81 TABLET ORAL DAILY
COMMUNITY

## 2024-02-12 NOTE — PROGRESS NOTES
Maternal Fetal Medicine Consult    Subjective     Patient ID: [id number]    Chief Complaint: mfm consult w/us (Hx fetal demise @ 24 weeks)      HPI: 23 y.o.  female  at 11w1d gestation with Estimated Date of Delivery: 24. by early US, not consistent with LMP. She is sent for MFM consultation for history of fetal demise.     She has history of FDIU at 24 weeks in her 1st pregnancy and also had early fetal growth restriction in that pregnancy.  She reports she had no symptoms and went in for an OB visit and had no heartbeat seen.  She had negative APS testing during her last pregnancy.  She had recurrent fetal growth restriction in her last 2 pregnancies.  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  She has increased BMI of 32.2 at initial MFM consultation visit.  She is on low-dose aspirin daily.       She denies any personal or family history of aneuploidy or anomalies. She denies any exposure to high fevers, viral rashes, illicit drugs or alcohol in this pregnancy.  She denies any leaking fluid, vaginal bleeding, contractions, decreased fetal movement. Denies headaches, visual disturbances, or epigastric pain.       Pregnancy complications include:  Patient Active Problem List   Diagnosis    Prior pregnancy with fetal demise and current pregnancy in first trimester    Hx of intrauterine growth restriction in prior pregnancy, currently pregnant    Herpes simplex virus type 2 (HSV-2) infection affecting pregnancy in first trimester    At high risk for complications of intrauterine pregnancy (IUP)    Increased BMI affecting pregnancy in first trimester        Past Medical History:   Diagnosis Date    Anemia     for this pregnancy    Anxiety disorder, unspecified 2018    current sometimes    Asthma 1998    not current    Herpes simplex virus (HSV) infection 2018    Panic attacks     not currently    Postpartum depression     not current       History reviewed. No pertinent surgical  "history.    Family History   Problem Relation Age of Onset    Miscarriages / Stillbirths Paternal Grandmother     Miscarriages / Stillbirths Maternal Grandmother     Seizures Maternal Grandmother     Hypertension Mother        Social History     Socioeconomic History    Marital status: Single   Tobacco Use    Smoking status: Never     Passive exposure: Never    Smokeless tobacco: Never   Substance and Sexual Activity    Alcohol use: Not Currently     Comment: twice a month/socially    Drug use: Not Currently    Sexual activity: Yes     Partners: Male       Current Outpatient Medications   Medication Sig Dispense Refill    aspirin (ECOTRIN) 81 MG EC tablet Take 81 mg by mouth once daily.      ondansetron (ZOFRAN) 4 MG tablet Take 4 mg by mouth every 8 (eight) hours as needed.      prenatal vit/iron fum/folic ac (PRENATAL 1+1 ORAL) Take by mouth.      LORazepam (ATIVAN) 1 MG tablet Take 0.5 tablets (0.5 mg total) by mouth every 6 (six) hours as needed for Anxiety. 10 tablet 0    sertraline (ZOLOFT) 25 MG tablet Take 25 mg by mouth once daily.       No current facility-administered medications for this visit.       Review of patient's allergies indicates:   Allergen Reactions    Amoxicillin Hives       Medications:  Current Outpatient Medications   Medication Instructions    aspirin (ECOTRIN) 81 mg, Oral, Daily    LORazepam (ATIVAN) 0.5 mg, Oral, Every 6 hours PRN    ondansetron (ZOFRAN) 4 mg, Oral, Every 8 hours PRN    prenatal vit/iron fum/folic ac (PRENATAL 1+1 ORAL) Oral    sertraline (ZOLOFT) 25 mg, Oral, Daily       Review of Systems   12 point review of systems conducted, negative except as stated in the history of present illness. See HPI for details.      Objective     Visit Vitals  /74 (BP Location: Left arm, Patient Position: Sitting, BP Method: Large (Automatic))   Pulse 70   Ht 5' 5" (1.651 m)   Wt 87.8 kg (193 lb 9.6 oz)   LMP  (LMP Unknown)   BMI 32.22 kg/m²        Physical Exam  Vitals and nursing " note reviewed.   Constitutional:       General: She is not in acute distress.     Appearance: Normal appearance.      Comments: Increased BMI   HENT:      Head: Normocephalic and atraumatic.      Nose: Nose normal. No congestion.      Mouth/Throat:      Pharynx: Oropharynx is clear.   Eyes:      General: No scleral icterus.     Conjunctiva/sclera: Conjunctivae normal.   Cardiovascular:      Rate and Rhythm: Normal rate and regular rhythm.   Pulmonary:      Effort: No respiratory distress.      Breath sounds: Normal breath sounds. No wheezing.   Abdominal:      General: Abdomen is flat.      Palpations: Abdomen is soft.      Tenderness: There is no abdominal tenderness. There is no right CVA tenderness, left CVA tenderness or guarding.      Comments: No CVA tenderness gravid uterus.    Musculoskeletal:         General: Normal range of motion.      Cervical back: Neck supple.      Right lower leg: No edema.      Left lower leg: No edema.   Skin:     General: Skin is warm.      Findings: No bruising or rash.   Neurological:      General: No focal deficit present.      Mental Status: She is oriented to person, place, and time.      Deep Tendon Reflexes: Reflexes normal.      Comments: Normal reflexes   Psychiatric:         Mood and Affect: Mood normal.         Behavior: Behavior normal.         Thought Content: Thought content normal.         Judgment: Judgment normal.         ASSESSMENT/PLAN:     23 y.o.  female with IUP at 11w1d    History of fetal demise at 24 weeks in previous pregnancy  Discussed the cause of fetal death is complex as there are many contributing and interacting factors. In addition, certain conditions may be associated with stillbirths without directly causing them--for example, autoimmune diseases and chronic conditions. Thus, for many stillbirths it is difficult to determine the exact cause, and even after extensive evaluation many stillbirths remain unexplained. I discussed with her risk  for recurrence with no predictability or prevention available, except for close monitoring with expectant management that is not 100% protective against adverse outcomes. With possible association with Antiphospholipid antibody syndrome, she previously had negative APS testing.    I suggest level 2 scan around 20 weeks and monitoring fetal growth every four weeks with consideration for fetal testing around 24 weeks' gestation    Discussed lack of consensus on optimal time of delivery with hx of fetal demise. Different opinions about optimal timing of delivery exist, in view of lack of data to support a specific approach, with consideration for delivery at 38-39 weeks,. Risks/benefits of each option, including prematurity with 38 weeks delivery, vs risk of abruption, demise if waiting till 39 weeks, were discussed and patient more comfortable with delivery at 38 weeks, knowing benefits and risks of both options. She will have delivery scheduled by 38 weeks.    She is to report any significant cramping or any vaginal bleeding. She is also to do fetal kick counts 3x/daily with immediate reporting of decreased fetal movement.       History of fetal growth restriction in 2 previous pregnancies  Discussed potential risk of recurrence of growth restriction in subsequent pregnancies.  We will plan to monitor fetal growth as above.  She is to do fetal kick counts 3x/daily with immediate reporting of decreased fetal movements (<10 movements/hr).         History of HSV 2 infection in pregnancy  I discussed risks with HSV-2 in pregnancy. I discussed with her the standard of care with history of genital herpes, is to allow vaginal delivery if no visible lesions or an active outbreak is present. I discussed with her that the risk of asymptomatic viral shedding could lead to  transmission; however, the risk is so remote in someone with history of genital herpes not having an outbreak, that a  section is not  recommended. A  section should be done for active lesions or prodromal symptoms in labor or PROM near term.     Potential benefit of using antiherpetic treatment like Valtrex to decrease the risk of outbreak at the time of labor, as well as decrease the risk of asymptomatic viral shedding was addressed. The risks and benefits were explained and recommend start suppressive treatment with 500mg of Valtrex BID OR acyclovir 400 mg three times a day, around 35-36 weeks gestation till delivery.   Questions were answered.       Invasive monitoring in labor could increase risk of  transmission by 6 fold. However, if clear indication for fetal scalp monitoring is present, it could reasonably be done in patient with no recurrent disease with no visible lesions.       At high risk for preeclampsia  With her risk factors for preeclampsia including  BMI over 30,  ancestry, low socioeconomic status, previous low birthweight or SGA baby, and previous pregnancy with poor obstetric history, discussed recommendations for low dose aspirin use to decrease risks for adverse outcomes, including preeclampsia, low birth rate and  delivery. Low-dose aspirin reduced the risk for preeclampsia by 15% in clinical trials and reduced the risk for  birth by 20% and FGR by 20%, and  mortality by around 20%.  After discussing risks/benefits of its use, it was agreed to continue asa 81 mg daily until delivery.. Also, recommend using in all future pregnancies.      Increased BMI in pregnancy  Body mass index is 32.22 kg/m².    I discussed the risk of miscarriage in first trimester, recurrent miscarriages, congenital anomalies, hypertension, diabetes,  labor and the higher risk of  section and the higher risk of fetal demise in-utero. There is also higher risk of for excessive fetal weight and large for gestational age (LGA) fetuses. Mothers with LGA fetuses are at higher risk of prolonged  labor,  delivery, shoulder dystocia and birth trauma. LGA neonates are increased risk of fetal hypoxia and intrauterine death, and are at risk to develop diabetes, obesity, metabolic syndrome, asthma and cancer later in life. She was advised of the importance of eating healthy and limiting weight gain to 11-20 lbs during the pregnancy, as optimal in this situation. I recommended low calorie, low fat diet avoiding any additional excessive weight gain. Excess weight gain would be associated with gestational hypertension, gestational diabetes and adverse  outcomes, including fetal demise in utero.    It is important to do FKC from 24 weeks till delivery.       Importance of working on losing weight after the pregnancy is over, especially before a future pregnancy was discussed. Breastfeeding may be an important tool in reducing the postpartum weight retention. Fetal risks were discussed with short term risk of fetal/ obesity and long term risk of adolescent component of metabolic syndrome.    Follow a healthy low caloric diet..     Patient had negative APS testing in the past with 1 fetal demise in utero at 24 weeks and history of fetal growth restriction in the 2 following pregnancies.  Suggest weekly testing at 24 weeks' gestation with serial ultrasounds and more frequent testing if evidence of fetal growth restriction or additional complications.  Patient is on daily aspirin we could continue that till delivery.  Importance of healthy weight gain to decrease the risk of preeclampsia discussed. Patient's questions were answered.  She is in agreement with plan of care.      Follow up in about 8 weeks (around 2024) for [facility] follow-up, Level 2 scan.     No future appointments.       Patient was evaluated by ELENA Martin and Dr. Cannon.  Final assessment and recommendations as stated above were made by Dr. Cannon.    This note was created with the assistance of [employer] voice recognition  software. There may be transcription errors as a result of using this technology, however minimal. Effort has been made to ensure accuracy of transcription, but any obvious errors or omissions should be clarified with the author of the document.

## 2024-03-08 ENCOUNTER — TELEPHONE (OUTPATIENT)
Dept: MATERNAL FETAL MEDICINE | Facility: CLINIC | Age: 24
End: 2024-03-08
Payer: MEDICAID

## 2024-03-08 DIAGNOSIS — O99.342 DEPRESSION DURING PREGNANCY IN SECOND TRIMESTER: Primary | ICD-10-CM

## 2024-03-08 DIAGNOSIS — F32.A DEPRESSION DURING PREGNANCY IN SECOND TRIMESTER: Primary | ICD-10-CM

## 2024-03-08 RX ORDER — BUSPIRONE HYDROCHLORIDE 5 MG/1
5 TABLET ORAL 2 TIMES DAILY
Qty: 60 TABLET | Refills: 3 | Status: SHIPPED | OUTPATIENT
Start: 2024-03-08 | End: 2024-05-30 | Stop reason: ALTCHOICE
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
## 2024-04-03 ENCOUNTER — TELEPHONE (OUTPATIENT)
Dept: MATERNAL FETAL MEDICINE | Facility: CLINIC | Age: 24
End: 2024-04-03
Payer: MEDICAID

## 2024-04-03 NOTE — TELEPHONE ENCOUNTER
----- Message from Madelyn Mireles PA-C sent at 4/3/2024 12:55 PM CDT -----  Regarding: FW: Medication  Please inform patient that we can give her the 988 packet at her visit on Monday  ----- Message -----  From: Elzbieta Alvarez MA  Sent: 4/3/2024   9:09 AM CDT  To: Madelyn Mireles PA-C  Subject: FW: Medication                                     ----- Message -----  From: Hannah Panda MA  Sent: 4/3/2024   9:05 AM CDT  To: #  Subject: Medication                                       Pt has issues with buspar. Works on a regular day but if her anxiety and depression is triggered it does not work at all. Patient  verbalized that the nurse who worked her up here informed her if the medication was not working they would have to refer her to a psychiatrist so she would like to see what her options are at the time.      Called Pt to let her know that we will give her the 988 packet on Monday when she visits.  Pt had no questions, Pt verbalized understanding.

## 2024-04-04 DIAGNOSIS — O09.299 HX OF INTRAUTERINE GROWTH RESTRICTION IN PRIOR PREGNANCY, CURRENTLY PREGNANT: ICD-10-CM

## 2024-04-04 DIAGNOSIS — Z36.89 ENCOUNTER FOR FETAL ANATOMIC SURVEY: ICD-10-CM

## 2024-04-04 DIAGNOSIS — O99.211 OBESITY AFFECTING PREGNANCY IN FIRST TRIMESTER, UNSPECIFIED OBESITY TYPE: ICD-10-CM

## 2024-04-04 DIAGNOSIS — O09.291 PRIOR PREGNANCY WITH FETAL DEMISE AND CURRENT PREGNANCY IN FIRST TRIMESTER: Primary | ICD-10-CM

## 2024-04-10 PROBLEM — F41.9 ANXIETY: Status: ACTIVE | Noted: 2024-04-10

## 2024-04-10 PROBLEM — F32.A DEPRESSION DURING PREGNANCY IN SECOND TRIMESTER: Status: ACTIVE | Noted: 2024-04-10

## 2024-04-10 PROBLEM — O99.342 DEPRESSION DURING PREGNANCY IN SECOND TRIMESTER: Status: ACTIVE | Noted: 2024-04-10

## 2024-04-10 NOTE — PROGRESS NOTES
Maternal Fetal Medicine Follow Up    Subjective:     Patient ID: [id number]    Chief Complaint: mfm followup w/us      HPI: Demond Phipps is a 23 y.o. female  at 19w4d gestation with Estimated Date of Delivery: 24  who is here for follow-up consultation by M.    She has history of FDIU at 24 weeks in her 1st pregnancy and also had early fetal growth restriction in that pregnancy.  She reports she had no symptoms and went in for an OB visit and had no heartbeat seen.  She had negative APS testing during her last pregnancy.  She had recurrent fetal growth restriction in her last 2 pregnancies.  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  She has increased BMI of 32.2 at initial MFM consultation visit.  She is on low-dose aspirin daily.  She has history of anxiety and depression and is currently on BuSpar 5 mg twice daily. Denies any SI/HI.         Interval history since last MFM visit: None.. She denies any leaking fluid, vaginal bleeding, contractions, decreased fetal movement. Denies headaches, visual disturbances, or epigastric pain.    Pregnancy complications include:   Patient Active Problem List   Diagnosis    Prior pregnancy with fetal demise and current pregnancy in second trimester    Hx of intrauterine growth restriction in prior pregnancy, currently pregnant    Herpes simplex virus type 2 (HSV-2) infection affecting pregnancy in second trimester    At high risk for complications of intrauterine pregnancy (IUP)    Increased BMI affecting pregnancy in second trimester    Depression during pregnancy in second trimester    Anxiety       No changes to medical, surgical, family, social, or obstetric history.    Medications:  Current Outpatient Medications   Medication Instructions    aspirin (ECOTRIN) 81 mg, Oral, Daily    busPIRone (BUSPAR) 5 mg, Oral, 2 times daily    ondansetron (ZOFRAN) 4 mg, Every 8 hours PRN    prenatal vit/iron fum/folic ac (PRENATAL 1+1 ORAL) Oral    sertraline  "(ZOLOFT) 25 mg, Oral, Daily       Review of Systems   12 point review of systems conducted, negative except as stated in the history of present illness. See HPI for details.      Objective:     Visit Vitals  /73 (BP Location: Left arm, Patient Position: Sitting, BP Method: Large (Automatic))   Pulse 92   Ht 5' 5" (1.651 m)   Wt 87 kg (191 lb 12.8 oz)   LMP  (LMP Unknown)   BMI 31.92 kg/m²        Physical Exam  Vitals and nursing note reviewed.   Constitutional:       Appearance: Normal appearance.      Comments: Increased BMI   HENT:      Head: Normocephalic and atraumatic.      Nose: Nose normal. No congestion.   Cardiovascular:      Rate and Rhythm: Normal rate.   Pulmonary:      Effort: Pulmonary effort is normal.   Skin:     Findings: No rash.   Neurological:      Mental Status: She is alert and oriented to person, place, and time.   Psychiatric:         Mood and Affect: Mood normal.         Behavior: Behavior normal.         Thought Content: Thought content normal.         Judgment: Judgment normal.         Assessment/Plan:     23 y.o.  female with IUP at 19w4d    History of fetal demise at 24 weeks in previous pregnancy  There is normal fetal growth and no anomalies seen on fetal anatomy scan.  AFV is normal.     Reviewed with her risk for recurrence with no predictability or prevention available, except for close monitoring with expectant management that is not 100% protective against adverse outcomes. With possible association with Antiphospholipid antibody syndrome, she previously had negative APS testing.     I suggest monitoring fetal growth every four weeks with consideration for weekly fetal testing around 24 weeks gestation (2 weeks before event or at time of viability).  Recommend fetal testing to be done twice weekly after 32 weeks and continued until delivery.    Discussed lack of consensus on optimal time of delivery with hx of fetal demise. Different opinions about optimal timing of " delivery exist, in view of lack of data to support a specific approach, with consideration for delivery at 38-39 weeks,. Risks/benefits of each option, including prematurity with 38 weeks delivery, vs risk of abruption, demise if waiting till 39 weeks, were discussed and patient more comfortable with delivery at 38 weeks, knowing benefits and risks of both options.    She is to report any significant cramping or any vaginal bleeding. She is also to do fetal kick counts 3x/daily with immediate reporting of decreased fetal movement.       History of fetal growth restriction in 2 previous pregnancies  Discussed potential risk of recurrence of growth restriction in subsequent pregnancies.  We will plan to monitor fetal growth as above.  She is to do fetal kick counts 3x/daily with immediate reporting of decreased fetal movements (<10 movements/hr).         History of HSV 2 infection in pregnancy  She is aware of the risks associated with HSV in pregnancy. Reviewed with her that the risk of asymptomatic viral shedding could lead to  transmission; however, the risk is so remote in someone with history of genital herpes not having an outbreak, that a  section is not recommended. A  section should be done for active lesions or prodromal symptoms in labor or PROM near term.     Potential benefit of using antiherpetic treatment like Valtrex to decrease the risk of outbreak at the time of labor, as well as decrease the risk of asymptomatic viral shedding was previously addressed. Recommend start suppressive treatment with 500mg of Valtrex BID OR acyclovir 400 mg three times a day, around 35-36 weeks gestation till delivery.        Invasive monitoring in labor could increase risk of  transmission by 6 fold. However, if clear indication for fetal scalp monitoring is present, it could reasonably be done in patient with no recurrent disease with no visible lesions.       At high risk for  preeclampsia  With her increased risk for preeclampsia, she agreed to continue asa 81 mg daily until delivery.. Preeclampsia precautions reviewed.       Increased BMI in pregnancy  Body mass index is 31.92 kg/m². With reasonable weight gain since last visit, she was advised to continue healthy and low caloric diet. Excess weight gain would be associated with gestational hypertension, gestational diabetes and adverse  outcomes, including fetal demise in utero.    It is important to lose weight after the pregnancy is over, especially before a future pregnancy was discussed. Breastfeeding may be an important tool in reducing the postpartum weight retention. Fetal risks were discussed with short term risk of fetal/ obesity and long term risk of adolescent component of metabolic syndrome.    She was advised to do fetal kick counts 3 times daily after 24 weeks, with immediate reporting of decreased fetal movements (<10 movements/hr).       Depression and anxiety in pregnancy  Discussed risks with depression/anxiety and risks/benefits of medication use in pregnancy.      ACOG recommends that therapy for mental health disorders during pregnancy be individualized. Treatment of anxiety and depression should incorporate the clinical expertise of her mental health clinician, her obstetrician, her primary care provider, and her pediatrician. The risks of untreated depression and the benefits of treatment must be weighed against the risks associated with the use of psychiatric medications during pregnancy.    It is reasonable to continue BuSpar 5 mg twice daily at this time. She was also advised to report any worsening of symptoms or SI/HI tendencies immediately to provider/ER for prompt intervention.  She was advised to establish care with a mental health provider.  She was given resources in the area.    Follow up in about 4 weeks (around 2024) for MFM follow-up, Repeat ultrasound, follow-up anatomy, BPP.      Future Appointments   Date Time Provider Department Center   5/9/2024 10:00 AM Miguel Cannon MD [facility]   5/9/2024 10:00 AM ROOM 3, MARKELL Mireles PA-C     This note was created with the assistance of [employer] voice recognition software. There may be transcription errors as a result of using this technology, however minimal. Effort has been made to ensure accuracy of transcription, but any obvious errors or omissions should be clarified with the author of the document.

## 2024-04-11 ENCOUNTER — PROCEDURE VISIT (OUTPATIENT)
Dept: MATERNAL FETAL MEDICINE | Facility: CLINIC | Age: 24
End: 2024-04-11
Payer: MEDICAID

## 2024-04-11 ENCOUNTER — OFFICE VISIT (OUTPATIENT)
Dept: MATERNAL FETAL MEDICINE | Facility: CLINIC | Age: 24
End: 2024-04-11
Payer: MEDICAID

## 2024-04-11 VITALS
DIASTOLIC BLOOD PRESSURE: 73 MMHG | HEART RATE: 92 BPM | SYSTOLIC BLOOD PRESSURE: 114 MMHG | HEIGHT: 65 IN | BODY MASS INDEX: 31.96 KG/M2 | WEIGHT: 191.81 LBS

## 2024-04-11 DIAGNOSIS — O09.292 PRIOR PREGNANCY WITH FETAL DEMISE AND CURRENT PREGNANCY IN SECOND TRIMESTER: ICD-10-CM

## 2024-04-11 DIAGNOSIS — O99.211 OBESITY AFFECTING PREGNANCY IN FIRST TRIMESTER, UNSPECIFIED OBESITY TYPE: ICD-10-CM

## 2024-04-11 DIAGNOSIS — O09.90 AT HIGH RISK FOR COMPLICATIONS OF INTRAUTERINE PREGNANCY (IUP): Primary | ICD-10-CM

## 2024-04-11 DIAGNOSIS — O09.299 HX OF INTRAUTERINE GROWTH RESTRICTION IN PRIOR PREGNANCY, CURRENTLY PREGNANT: ICD-10-CM

## 2024-04-11 DIAGNOSIS — Z36.89 ENCOUNTER FOR FETAL ANATOMIC SURVEY: ICD-10-CM

## 2024-04-11 DIAGNOSIS — O99.342 DEPRESSION DURING PREGNANCY IN SECOND TRIMESTER: ICD-10-CM

## 2024-04-11 DIAGNOSIS — B00.9 HERPES SIMPLEX VIRUS TYPE 2 (HSV-2) INFECTION AFFECTING PREGNANCY IN SECOND TRIMESTER: ICD-10-CM

## 2024-04-11 DIAGNOSIS — F32.A DEPRESSION DURING PREGNANCY IN SECOND TRIMESTER: ICD-10-CM

## 2024-04-11 DIAGNOSIS — O98.512 HERPES SIMPLEX VIRUS TYPE 2 (HSV-2) INFECTION AFFECTING PREGNANCY IN SECOND TRIMESTER: ICD-10-CM

## 2024-04-11 DIAGNOSIS — O09.291 PRIOR PREGNANCY WITH FETAL DEMISE AND CURRENT PREGNANCY IN FIRST TRIMESTER: ICD-10-CM

## 2024-04-11 DIAGNOSIS — O99.212 OBESITY AFFECTING PREGNANCY IN SECOND TRIMESTER, UNSPECIFIED OBESITY TYPE: ICD-10-CM

## 2024-04-11 DIAGNOSIS — F41.9 ANXIETY: ICD-10-CM

## 2024-04-11 PROCEDURE — 76811 OB US DETAILED SNGL FETUS: CPT | Mod: S$GLB,,, | Performed by: OBSTETRICS & GYNECOLOGY

## 2024-04-11 PROCEDURE — 1160F RVW MEDS BY RX/DR IN RCRD: CPT | Mod: CPTII,S$GLB,,

## 2024-04-11 PROCEDURE — 1159F MED LIST DOCD IN RCRD: CPT | Mod: CPTII,S$GLB,,

## 2024-04-11 PROCEDURE — 3074F SYST BP LT 130 MM HG: CPT | Mod: CPTII,S$GLB,,

## 2024-04-11 PROCEDURE — 3078F DIAST BP <80 MM HG: CPT | Mod: CPTII,S$GLB,,

## 2024-04-11 PROCEDURE — 99213 OFFICE O/P EST LOW 20 MIN: CPT | Mod: TH,S$GLB,,

## 2024-04-11 PROCEDURE — 3008F BODY MASS INDEX DOCD: CPT | Mod: CPTII,S$GLB,,

## 2024-04-11 RX ORDER — SERTRALINE HYDROCHLORIDE 25 MG/1
25 TABLET, FILM COATED ORAL DAILY
COMMUNITY

## 2024-05-06 DIAGNOSIS — O99.212 OBESITY AFFECTING PREGNANCY IN SECOND TRIMESTER, UNSPECIFIED OBESITY TYPE: ICD-10-CM

## 2024-05-06 DIAGNOSIS — O09.292 PRIOR PREGNANCY WITH FETAL DEMISE AND CURRENT PREGNANCY IN SECOND TRIMESTER: Primary | ICD-10-CM

## 2024-05-06 DIAGNOSIS — O09.299 HX OF INTRAUTERINE GROWTH RESTRICTION IN PRIOR PREGNANCY, CURRENTLY PREGNANT: ICD-10-CM

## 2024-05-08 NOTE — PROGRESS NOTES
Maternal Fetal Medicine Follow Up    Subjective:     Patient ID: [id number]    Chief Complaint: M follow up with US      HPI: Demond Phipps is a 23 y.o. female  at 23w4d gestation with Estimated Date of Delivery: 24  who is here for follow-up consultation by MFM.    She has history of FDIU at 24 weeks in her 1st pregnancy and also had early fetal growth restriction in that pregnancy.  She reports she had no symptoms and went in for an OB visit and had no heartbeat seen.  She had negative APS testing during her last pregnancy.  She had recurrent fetal growth restriction in her last 2 pregnancies.  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  She had increased BMI of 32.2 at initial MFM consultation visit.  She is on low-dose aspirin daily.  She has history of anxiety and depression and is currently on BuSpar 5 mg twice daily, feeling well at this time. She denies any SI/HI.         Interval history since last MFM visit: None.. She denies any leaking fluid, vaginal bleeding, contractions, decreased fetal movement. Denies headaches, visual disturbances, or epigastric pain.    Pregnancy complications include:   Patient Active Problem List   Diagnosis    Prior pregnancy with fetal demise and current pregnancy in second trimester    Hx of intrauterine growth restriction in prior pregnancy, currently pregnant    Herpes simplex virus type 2 (HSV-2) infection affecting pregnancy in second trimester    At high risk for complications of intrauterine pregnancy (IUP)    Increased BMI affecting pregnancy in second trimester    Depression during pregnancy in second trimester    Anxiety       No changes to medical, surgical, family, social, or obstetric history.    Medications:  Current Outpatient Medications   Medication Instructions    aspirin (ECOTRIN) 81 mg, Oral, Daily    busPIRone (BUSPAR) 5 mg, Oral, 2 times daily    ondansetron (ZOFRAN) 4 mg, Every 8 hours PRN    prenatal vit/iron fum/folic ac  "(PRENATAL 1+1 ORAL) Oral    sertraline (ZOLOFT) 25 mg, Oral, Daily       Review of Systems   12 point review of systems conducted, negative except as stated in the history of present illness. See HPI for details.      Objective:     Visit Vitals  /69 (BP Location: Left arm, Patient Position: Sitting, BP Method: Large (Automatic))   Pulse 89   Ht 5' 5" (1.651 m)   Wt 87.5 kg (193 lb)   LMP  (LMP Unknown)   BMI 32.12 kg/m²        Physical Exam  Vitals and nursing note reviewed.   Constitutional:       Appearance: Normal appearance.      Comments: Increased BMI   HENT:      Head: Normocephalic and atraumatic.      Nose: Nose normal. No congestion.   Cardiovascular:      Rate and Rhythm: Normal rate.   Pulmonary:      Effort: Pulmonary effort is normal.   Skin:     Findings: No rash.   Neurological:      Mental Status: She is alert and oriented to person, place, and time.   Psychiatric:         Mood and Affect: Mood normal.         Behavior: Behavior normal.         Thought Content: Thought content normal.         Judgment: Judgment normal.         Assessment/Plan:     23 y.o.  female with IUP at 23w4d    History of fetal demise at 24 weeks in previous pregnancy  There is normal fetal growth with an EFW of 631 g at the 49% and the AC at the 27% on 24.  Fetal anatomy scan complete except for spine and the baby's in the supine position.  AFV is normal 2024 BPP is 8/8.    She had Negative APS testing    I suggest monitoring fetal growth every four weeks with consideration for weekly fetal testing around 24 weeks gestation. Recommend fetal testing to be done twice weekly after 32 weeks and continued until delivery.    Different opinions about optimal timing of delivery exist, in view of lack of data to support a specific approach, with consideration for delivery at 38-39 weeks,. Risks/benefits of each option, including prematurity with 38 weeks delivery, vs risk of abruption, demise if waiting till 39 " weeks, were discussed and patient more comfortable with delivery at 38 weeks, knowing benefits and risks of both options.    She is to report any significant cramping or any vaginal bleeding. She is also to do fetal kick counts 3x/daily with immediate reporting of decreased fetal movement.       History of fetal growth restriction in 2 previous pregnancies  There is potential risk of recurrence of growth restriction in subsequent pregnancies.  We will plan to monitor fetal growth as above.        History of HSV 2 infection in pregnancy  A  section should be done for active lesions or prodromal symptoms in labor or PROM near term.     Recommend start suppressive treatment with 500mg of Valtrex BID OR acyclovir 400 mg three times a day, around 35-36 weeks gestation until delivery.        Invasive monitoring in labor could increase risk of  transmission by 6 fold. However, if clear indication for fetal scalp monitoring is present, it could reasonably be done in patient with no recurrent disease with no visible lesions.       At high risk for preeclampsia  With her increased risk for preeclampsia, she agreed to continue asa 81 mg daily until delivery.. Preeclampsia precautions reviewed.       Increased BMI in pregnancy  Body mass index is 32.12 kg/m². With relatively stable weight recently, she was advised to follow a healthy low caloric diet.  Excess weight gain would be associated with gestational hypertension, gestational diabetes and adverse  outcomes, including fetal demise in utero.    Reviewed importance of FKC 3/day and prn with instructions to immediately report any decreased fetal movement.    It is important to lose weight after the pregnancy is over, especially before a future pregnancy. Breastfeeding may be an important tool in reducing the postpartum weight retention. Fetal risks were discussed with short term risk of fetal/ obesity and long term risk of adolescent  component of metabolic syndrome.      Depression and anxiety in pregnancy  With good symptom control, reasonable to continue BuSpar 5 mg twice daily at this time. She was also advised to report any worsening of symptoms or SI/HI tendencies immediately to provider/ER for prompt intervention.  She was advised to establish care with a mental health provider.  She was given resources in the area.    Follow up in about 3 weeks (around 5/30/2024) for BPP, Repeat  ultrasound, MFM follow-up.     No future appointments.       Patient was evaluated and examined by Dr. Cannon. REGGIE Yeung, helped in pre charting of part of note.      This note was created with the assistance of [employer] voice recognition software. There may be transcription errors as a result of using this technology, however minimal. Effort has been made to ensure accuracy of transcription, but any obvious errors or omissions should be clarified with the author of the document.

## 2024-05-09 ENCOUNTER — OFFICE VISIT (OUTPATIENT)
Dept: MATERNAL FETAL MEDICINE | Facility: CLINIC | Age: 24
End: 2024-05-09
Payer: MEDICAID

## 2024-05-09 ENCOUNTER — PROCEDURE VISIT (OUTPATIENT)
Dept: MATERNAL FETAL MEDICINE | Facility: CLINIC | Age: 24
End: 2024-05-09
Payer: MEDICAID

## 2024-05-09 VITALS
DIASTOLIC BLOOD PRESSURE: 69 MMHG | SYSTOLIC BLOOD PRESSURE: 117 MMHG | BODY MASS INDEX: 32.15 KG/M2 | HEIGHT: 65 IN | HEART RATE: 89 BPM | WEIGHT: 193 LBS

## 2024-05-09 DIAGNOSIS — O99.212 OBESITY AFFECTING PREGNANCY IN SECOND TRIMESTER, UNSPECIFIED OBESITY TYPE: ICD-10-CM

## 2024-05-09 DIAGNOSIS — F32.A DEPRESSION DURING PREGNANCY IN SECOND TRIMESTER: ICD-10-CM

## 2024-05-09 DIAGNOSIS — O09.299 HX OF INTRAUTERINE GROWTH RESTRICTION IN PRIOR PREGNANCY, CURRENTLY PREGNANT: ICD-10-CM

## 2024-05-09 DIAGNOSIS — O09.292 PRIOR PREGNANCY WITH FETAL DEMISE AND CURRENT PREGNANCY IN SECOND TRIMESTER: ICD-10-CM

## 2024-05-09 DIAGNOSIS — O98.512 HERPES SIMPLEX VIRUS TYPE 2 (HSV-2) INFECTION AFFECTING PREGNANCY IN SECOND TRIMESTER: ICD-10-CM

## 2024-05-09 DIAGNOSIS — O99.342 DEPRESSION DURING PREGNANCY IN SECOND TRIMESTER: ICD-10-CM

## 2024-05-09 DIAGNOSIS — B00.9 HERPES SIMPLEX VIRUS TYPE 2 (HSV-2) INFECTION AFFECTING PREGNANCY IN SECOND TRIMESTER: ICD-10-CM

## 2024-05-09 DIAGNOSIS — O09.90 AT HIGH RISK FOR COMPLICATIONS OF INTRAUTERINE PREGNANCY (IUP): ICD-10-CM

## 2024-05-09 DIAGNOSIS — F41.9 ANXIETY: Primary | ICD-10-CM

## 2024-05-09 PROCEDURE — 3074F SYST BP LT 130 MM HG: CPT | Mod: CPTII,S$GLB,, | Performed by: OBSTETRICS & GYNECOLOGY

## 2024-05-09 PROCEDURE — 1160F RVW MEDS BY RX/DR IN RCRD: CPT | Mod: CPTII,S$GLB,, | Performed by: OBSTETRICS & GYNECOLOGY

## 2024-05-09 PROCEDURE — 76819 FETAL BIOPHYS PROFIL W/O NST: CPT | Mod: S$GLB,,, | Performed by: OBSTETRICS & GYNECOLOGY

## 2024-05-09 PROCEDURE — 99213 OFFICE O/P EST LOW 20 MIN: CPT | Mod: TH,S$GLB,, | Performed by: OBSTETRICS & GYNECOLOGY

## 2024-05-09 PROCEDURE — 76816 OB US FOLLOW-UP PER FETUS: CPT | Mod: S$GLB,,, | Performed by: OBSTETRICS & GYNECOLOGY

## 2024-05-09 PROCEDURE — 3078F DIAST BP <80 MM HG: CPT | Mod: CPTII,S$GLB,, | Performed by: OBSTETRICS & GYNECOLOGY

## 2024-05-09 PROCEDURE — 3008F BODY MASS INDEX DOCD: CPT | Mod: CPTII,S$GLB,, | Performed by: OBSTETRICS & GYNECOLOGY

## 2024-05-09 PROCEDURE — 1159F MED LIST DOCD IN RCRD: CPT | Mod: CPTII,S$GLB,, | Performed by: OBSTETRICS & GYNECOLOGY

## 2024-05-28 DIAGNOSIS — O09.299 HX OF INTRAUTERINE GROWTH RESTRICTION IN PRIOR PREGNANCY, CURRENTLY PREGNANT: Primary | ICD-10-CM

## 2024-05-29 NOTE — PROGRESS NOTES
Maternal Fetal Medicine Follow Up    Subjective:     Patient ID: [id number]    Chief Complaint: high risk pregnancy followup      HPI: Demond Phipps is a 23 y.o. female  at 26w4d gestation with Estimated Date of Delivery: 24  who is here for follow-up consultation by M.    She has history of FDIU at 24 weeks in her 1st pregnancy and also had early fetal growth restriction in that pregnancy.  She reports she had no symptoms and went in for an OB visit and had no heartbeat seen.  She had negative APS testing during her last pregnancy.  She had recurrent fetal growth restriction in her last 2 pregnancies.  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  She had increased BMI of 32.2 at initial MFM consultation visit.  She is on low-dose aspirin daily.  She has history of anxiety and depression and is currently on sertraline 25mg daily, feeling well at this time. She denies any SI/HI.         Interval history since last MFM visit: None.. She denies any leaking fluid, vaginal bleeding, contractions, decreased fetal movement. Denies headaches, visual disturbances, or epigastric pain.    Pregnancy complications include:   Patient Active Problem List   Diagnosis    Prior pregnancy with fetal demise and current pregnancy in second trimester    Hx of intrauterine growth restriction in prior pregnancy, currently pregnant    Herpes simplex virus type 2 (HSV-2) infection affecting pregnancy in second trimester    At high risk for complications of intrauterine pregnancy (IUP)    Increased BMI affecting pregnancy in second trimester    Depression during pregnancy in second trimester    Anxiety       No changes to medical, surgical, family, social, or obstetric history.    Medications:  Current Outpatient Medications   Medication Instructions    aspirin (ECOTRIN) 81 mg, Oral, Daily    busPIRone (BUSPAR) 5 mg, Oral, 2 times daily    ondansetron (ZOFRAN) 4 mg, Every 8 hours PRN    prenatal vit/iron fum/folic ac  "(PRENATAL 1+1 ORAL) Oral    sertraline (ZOLOFT) 25 mg, Oral, Daily       Review of Systems   12 point review of systems conducted, negative except as stated in the history of present illness. See HPI for details.      Objective:     Visit Vitals  /67 (BP Location: Left arm, Patient Position: Sitting, BP Method: Large (Automatic))   Pulse 81   Ht 5' 5" (1.651 m)   Wt 89.4 kg (197 lb)   LMP  (LMP Unknown)   BMI 32.78 kg/m²        Physical Exam  Vitals and nursing note reviewed.   Constitutional:       Appearance: Normal appearance.   HENT:      Head: Normocephalic and atraumatic.   Cardiovascular:      Rate and Rhythm: Normal rate and regular rhythm.   Pulmonary:      Effort: Pulmonary effort is normal. No respiratory distress.      Breath sounds: Normal breath sounds.   Abdominal:      Palpations: Abdomen is soft.      Tenderness: There is no abdominal tenderness.   Musculoskeletal:      Right lower leg: No edema.      Left lower leg: No edema.   Neurological:      Mental Status: She is alert and oriented to person, place, and time.      Deep Tendon Reflexes: Reflexes normal.   Psychiatric:         Mood and Affect: Mood normal.         Behavior: Behavior normal.         Thought Content: Thought content normal.         Judgment: Judgment normal.         Assessment/Plan:     23 y.o.  female with IUP at 26w4d    History of fetal demise at 24 weeks in previous pregnancy  The fetal anatomic survey was completed today , and no fetal structural abnormalities were identified of the structures imaged.  Fetal size is AGA with the EFW at the 53% and the AC at the 39%. The EFW is 1006 g on 24.  AFV is normal today, 2024     She had Negative APS testing    We have suggested monitoring fetal growth every four weeks  Recommend fetal testing to be started twice weekly at 32 weeks and continued until delivery, to alternate with Primary OB.    Different opinions about optimal timing of delivery exist, in " view of lack of data to support a specific approach, with consideration for delivery at 38-39 weeks. Discussed risks/benefits of both options at previous visit, and patient has decided to deliver at 38 weeks, understanding R/B both options.    Reviewed instructions to report any significant cramping or any vaginal bleeding.   Reviewed importance of FKC 3/day and prn with instructions to immediately report any decreased fetal movement.    History of fetal growth restriction in 2 previous pregnancies  There is potential risk of recurrence of growth restriction in subsequent pregnancies.  We will plan to monitor fetal growth as above.        History of HSV 2 infection in pregnancy  A  section should be done for active lesions or prodromal symptoms in labor or PROM near term.     Recommend start suppressive treatment with 500mg of Valtrex BID OR acyclovir 400 mg three times a day, around 34-35 weeks gestation until delivery.        Invasive monitoring in labor could increase risk of  transmission by 6 fold. However, if clear indication for fetal scalp monitoring is present, it could reasonably be done in patient with no recurrent disease with no visible lesions.       At high risk for preeclampsia  With her increased risk for preeclampsia, she agreed to continue asa 81 mg daily until delivery.. Preeclampsia precautions reviewed.       Increased BMI in pregnancy  Body mass index is 32.78 kg/m². With relatively stable weight recently, she was advised to follow a healthy diet.  Excess weight gain would be associated with gestational hypertension, gestational diabetes and adverse  outcomes, including fetal demise in utero.    Reviewed importance of FKC 3/day and prn with instructions to immediately report any decreased fetal movement.    It is important to lose weight after the pregnancy is over, especially before a future pregnancy. Breastfeeding may be an important tool in reducing the postpartum  weight retention. Fetal risks were discussed with short term risk of fetal/ obesity and long term risk of adolescent component of metabolic syndrome.      Depression and anxiety in pregnancy  With good symptom control, reasonable to continue sertraline 25 mg daily at this time. She was also advised to report any worsening of symptoms or SI/HI tendencies immediately to provider/ER for prompt intervention. Keep followup care as scheduled with a mental health provider.      Follow up in about 4 weeks (around 2024) for MFM Followup, Repeat Ultrasound.     No future appointments.    REGGIE Yeung

## 2024-05-30 ENCOUNTER — OFFICE VISIT (OUTPATIENT)
Dept: MATERNAL FETAL MEDICINE | Facility: CLINIC | Age: 24
End: 2024-05-30
Payer: MEDICAID

## 2024-05-30 ENCOUNTER — LAB VISIT (OUTPATIENT)
Dept: LAB | Facility: HOSPITAL | Age: 24
End: 2024-05-30
Attending: OBSTETRICS & GYNECOLOGY
Payer: MEDICAID

## 2024-05-30 ENCOUNTER — PROCEDURE VISIT (OUTPATIENT)
Dept: MATERNAL FETAL MEDICINE | Facility: CLINIC | Age: 24
End: 2024-05-30
Payer: MEDICAID

## 2024-05-30 VITALS
SYSTOLIC BLOOD PRESSURE: 119 MMHG | HEIGHT: 65 IN | HEART RATE: 81 BPM | BODY MASS INDEX: 32.82 KG/M2 | WEIGHT: 197 LBS | DIASTOLIC BLOOD PRESSURE: 67 MMHG

## 2024-05-30 DIAGNOSIS — O98.512 HERPES SIMPLEX VIRUS TYPE 2 (HSV-2) INFECTION AFFECTING PREGNANCY IN SECOND TRIMESTER: ICD-10-CM

## 2024-05-30 DIAGNOSIS — O09.299 HX OF INTRAUTERINE GROWTH RESTRICTION IN PRIOR PREGNANCY, CURRENTLY PREGNANT: ICD-10-CM

## 2024-05-30 DIAGNOSIS — Z34.82 PRENATAL CARE, SUBSEQUENT PREGNANCY IN SECOND TRIMESTER: Primary | ICD-10-CM

## 2024-05-30 DIAGNOSIS — O09.292 PRIOR PREGNANCY WITH FETAL DEMISE AND CURRENT PREGNANCY IN SECOND TRIMESTER: ICD-10-CM

## 2024-05-30 DIAGNOSIS — O99.342 DEPRESSION DURING PREGNANCY IN SECOND TRIMESTER: ICD-10-CM

## 2024-05-30 DIAGNOSIS — O99.212 OBESITY AFFECTING PREGNANCY IN SECOND TRIMESTER, UNSPECIFIED OBESITY TYPE: ICD-10-CM

## 2024-05-30 DIAGNOSIS — B00.9 HERPES SIMPLEX VIRUS TYPE 2 (HSV-2) INFECTION AFFECTING PREGNANCY IN SECOND TRIMESTER: ICD-10-CM

## 2024-05-30 DIAGNOSIS — F32.A DEPRESSION DURING PREGNANCY IN SECOND TRIMESTER: ICD-10-CM

## 2024-05-30 DIAGNOSIS — F41.9 ANXIETY: Primary | ICD-10-CM

## 2024-05-30 DIAGNOSIS — O09.90 AT HIGH RISK FOR COMPLICATIONS OF INTRAUTERINE PREGNANCY (IUP): ICD-10-CM

## 2024-05-30 LAB
GLUCOSE 1H P 100 G GLC PO SERPL-MCNC: 115 MG/DL (ref 100–180)
HCT VFR BLD AUTO: 30.2 % (ref 37–47)
HGB BLD-MCNC: 9.3 G/DL (ref 12–16)

## 2024-05-30 PROCEDURE — 76816 OB US FOLLOW-UP PER FETUS: CPT | Mod: S$GLB,,, | Performed by: OBSTETRICS & GYNECOLOGY

## 2024-05-30 PROCEDURE — 3008F BODY MASS INDEX DOCD: CPT | Mod: CPTII,S$GLB,, | Performed by: NURSE PRACTITIONER

## 2024-05-30 PROCEDURE — 99213 OFFICE O/P EST LOW 20 MIN: CPT | Mod: TH,S$GLB,, | Performed by: NURSE PRACTITIONER

## 2024-05-30 PROCEDURE — 82950 GLUCOSE TEST: CPT

## 2024-05-30 PROCEDURE — 1159F MED LIST DOCD IN RCRD: CPT | Mod: CPTII,S$GLB,, | Performed by: NURSE PRACTITIONER

## 2024-05-30 PROCEDURE — 3078F DIAST BP <80 MM HG: CPT | Mod: CPTII,S$GLB,, | Performed by: NURSE PRACTITIONER

## 2024-05-30 PROCEDURE — 1160F RVW MEDS BY RX/DR IN RCRD: CPT | Mod: CPTII,S$GLB,, | Performed by: NURSE PRACTITIONER

## 2024-05-30 PROCEDURE — 85018 HEMOGLOBIN: CPT

## 2024-05-30 PROCEDURE — 36415 COLL VENOUS BLD VENIPUNCTURE: CPT

## 2024-05-30 PROCEDURE — 3074F SYST BP LT 130 MM HG: CPT | Mod: CPTII,S$GLB,, | Performed by: NURSE PRACTITIONER

## 2024-06-17 LAB
C TRACH RRNA SPEC QL PROBE: NEGATIVE
N GONORRHOEAE, AMPLIFIED DNA: NEGATIVE

## 2024-06-24 DIAGNOSIS — F32.A DEPRESSION DURING PREGNANCY IN SECOND TRIMESTER: ICD-10-CM

## 2024-06-24 DIAGNOSIS — O98.512 HERPES SIMPLEX VIRUS TYPE 2 (HSV-2) INFECTION AFFECTING PREGNANCY IN SECOND TRIMESTER: Primary | ICD-10-CM

## 2024-06-24 DIAGNOSIS — O99.212 OBESITY AFFECTING PREGNANCY IN SECOND TRIMESTER, UNSPECIFIED OBESITY TYPE: ICD-10-CM

## 2024-06-24 DIAGNOSIS — O09.292 PRIOR PREGNANCY WITH FETAL DEMISE AND CURRENT PREGNANCY IN SECOND TRIMESTER: ICD-10-CM

## 2024-06-24 DIAGNOSIS — O99.342 DEPRESSION DURING PREGNANCY IN SECOND TRIMESTER: ICD-10-CM

## 2024-06-24 DIAGNOSIS — B00.9 HERPES SIMPLEX VIRUS TYPE 2 (HSV-2) INFECTION AFFECTING PREGNANCY IN SECOND TRIMESTER: Primary | ICD-10-CM

## 2024-06-26 PROBLEM — O99.343 DEPRESSION DURING PREGNANCY IN THIRD TRIMESTER: Status: ACTIVE | Noted: 2024-04-10

## 2024-06-26 PROBLEM — O09.293 PRIOR PREGNANCY WITH FETAL DEMISE AND CURRENT PREGNANCY IN THIRD TRIMESTER: Status: ACTIVE | Noted: 2024-02-12

## 2024-06-26 PROBLEM — O98.513 HERPES SIMPLEX VIRUS TYPE 2 (HSV-2) INFECTION AFFECTING PREGNANCY IN THIRD TRIMESTER: Status: ACTIVE | Noted: 2024-02-12

## 2024-06-26 PROBLEM — O99.213 OBESITY AFFECTING PREGNANCY IN THIRD TRIMESTER: Status: ACTIVE | Noted: 2024-02-12

## 2024-06-26 PROBLEM — O99.013 ANEMIA AFFECTING PREGNANCY IN THIRD TRIMESTER: Status: ACTIVE | Noted: 2024-06-26

## 2024-06-26 NOTE — PROGRESS NOTES
Maternal Fetal Medicine Follow Up    Subjective:     Patient ID: [id number]    Chief Complaint: high risk pregnancy followup      HPI: Demond Phipps is a 23 y.o. female  at 30w4d gestation with Estimated Date of Delivery: 24  who is here for follow-up consultation by M.    She has history of FDIU at 24 weeks in her 1st pregnancy and also had early fetal growth restriction in that pregnancy.  She reports she had no symptoms and went in for an OB visit and had no heartbeat seen.  She had negative APS testing during her last pregnancy.  She had recurrent fetal growth restriction in her last 2 pregnancies.  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  She had increased BMI of 32.2 at initial MFM consultation visit.  She is on low-dose aspirin daily.  She has history of anxiety and depression and is currently on sertraline 25mg daily, feeling well at this time. She denies any SI/HI.  She was noted to be anemic with H&H 9.3/30.2 on 2024.        Interval history since last MFM visit: None.. She denies any leaking fluid, vaginal bleeding, contractions, decreased fetal movement. Denies headaches, visual disturbances, or epigastric pain.    Pregnancy complications include:   Patient Active Problem List   Diagnosis    Prior pregnancy with fetal demise and current pregnancy in third trimester    Hx of intrauterine growth restriction in prior pregnancy, currently pregnant    Herpes simplex virus type 2 (HSV-2) infection affecting pregnancy in third trimester    At high risk for complications of intrauterine pregnancy (IUP)    Increased BMI affecting pregnancy in third trimester    Depression during pregnancy in third trimester    Anxiety    Anemia affecting pregnancy in third trimester       No changes to medical, surgical, family, social, or obstetric history.    Medications:  Current Outpatient Medications   Medication Instructions    ascorbic acid (vitamin C) (VITAMIN C) 250 mg, Oral, Every  "other day    aspirin (ECOTRIN) 81 mg, Oral, Daily    ferrous sulfate (FEOSOL) 325 mg, Oral, Every other day    folic acid (FOLVITE) 1 mg, Oral, Daily    prenatal vit/iron fum/folic ac (PRENATAL 1+1 ORAL) Oral    sertraline (ZOLOFT) 25 mg, Oral, Daily       Review of Systems   12 point review of systems conducted, negative except as stated in the history of present illness. See HPI for details.      Objective:     Visit Vitals  /71 (BP Location: Left arm, Patient Position: Sitting, BP Method: Medium (Automatic))   Pulse 83   Ht 5' 5" (1.651 m)   Wt 88.9 kg (196 lb)   LMP  (LMP Unknown)   BMI 32.62 kg/m²        Physical Exam  Vitals and nursing note reviewed.   Constitutional:       Appearance: Normal appearance.   HENT:      Head: Normocephalic and atraumatic.   Cardiovascular:      Rate and Rhythm: Normal rate and regular rhythm.   Pulmonary:      Effort: Pulmonary effort is normal. No respiratory distress.      Breath sounds: Normal breath sounds.   Abdominal:      Palpations: Abdomen is soft.      Tenderness: There is no abdominal tenderness.   Musculoskeletal:      Right lower leg: No edema.      Left lower leg: No edema.   Neurological:      Mental Status: She is alert and oriented to person, place, and time.      Deep Tendon Reflexes: Reflexes normal.   Psychiatric:         Mood and Affect: Mood normal.         Behavior: Behavior normal.         Thought Content: Thought content normal.         Judgment: Judgment normal.         Assessment/Plan:     23 y.o.  female with IUP at 30w4d    History of fetal demise at 24 weeks in previous pregnancy  There is normal fetal growth with an EFW of 1818 g at the 51% and the AC at the 56% on 24.  AFV is normal. BPP is 8/8 today (2024).     She had Negative APS testing    We have suggested monitoring fetal growth every 4 weeks. Recommend fetal testing to be done twice weekly at 32 weeks and continued until delivery, to alternate with Primary " OB.    Different opinions about optimal timing of delivery exist, in view of lack of data to support a specific approach, with consideration for delivery at 38-39 weeks. Discussed risks/benefits of both options at previous visit, and patient has decided to deliver at 38 weeks, understanding R/B both options.    Reviewed instructions to report any significant cramping or any vaginal bleeding.   Reviewed importance of FKC 3/day and prn with instructions to immediately report any decreased fetal movement.      History of fetal growth restriction in 2 previous pregnancies  There is potential risk of recurrence of growth restriction in subsequent pregnancies.  We will plan to monitor fetal growth as above.        History of HSV 2 infection in pregnancy  A  section should be done for active lesions or prodromal symptoms in labor or PROM near term.     Recommend start suppressive treatment with 500mg of Valtrex BID OR acyclovir 400 mg three times a day, around 34-35 weeks gestation until delivery.        Invasive monitoring in labor could increase risk of  transmission by 6 fold. However, if clear indication for fetal scalp monitoring is present, it could reasonably be done in patient with no recurrent disease with no visible lesions.       At high risk for preeclampsia  With her increased risk for preeclampsia, she agreed to continue asa 81 mg daily until delivery.. Preeclampsia precautions reviewed.       Increased BMI in pregnancy  Body mass index is 32.62 kg/m². With relatively stable weight recently, she was advised to follow a healthy diet.  Excess weight gain would be associated with gestational hypertension, gestational diabetes and adverse  outcomes, including fetal demise in utero.    Reviewed importance of FKC 3/day and prn with instructions to immediately report any decreased fetal movement.    It is important to lose weight after the pregnancy is over, especially before a future  pregnancy. Breastfeeding may be an important tool in reducing the postpartum weight retention. Fetal risks were discussed with short term risk of fetal/ obesity and long term risk of adolescent component of metabolic syndrome.      Depression and anxiety in pregnancy  With good symptom control, reasonable to continue sertraline 25 mg daily at this time. She was also advised to report any worsening of symptoms or SI/HI tendencies immediately to provider/ER for prompt intervention. Keep followup care as scheduled with a mental health provider.        Anemia in pregnancy  The World Health Organization (WHO) defines anemia as a hemoglobin level <11 g/dL (approximately equivalent to a hematocrit <33 percent) in the first trimester, <10.5 g/dL in the second trimester, <10.5 to 11 g/dL in the third trimester, or <10 g/dL postpartum. Anemia affects approximately 30 percent of reproductive-age females and 40 percent of pregnant individuals, mostly due to iron deficiency.       Physiologic anemia of pregnancy and iron deficiency are the two most common causes of anemia in pregnancy. Much less common causes of anemia include hemoglobinopathies (sickle cell, thalassemia), RBC membrane disorders (hereditary spherocytosis and had hereditary Elliptocytosis), and acquired anemias (folate deficiency, vitamin B12 deficiency, other vitamin deficiencies, autoimmune hemolytic anemia Anemia, hypothyroidism and chronic kidney disease). All gravidas with anemia or symptoms of anemia should have prompt testing for iron deficiency because iron deficiency can progress to anemia; iron deficiency is the most common pathologic cause of anemia in pregnancy.    I discussed with her that iron deficiency during the first two trimesters of pregnancy is associated with a 2-fold increased risk for  delivery and a 3-fold increased risk for delivering a low-birth-weight baby.    We will start the patient oral hematinic therapy with ferrous  sulfate 325 mg every other day, vitamin-C 250 mg every other day, and folic acid 1 mg daily.  Recommend intermittent CBC throughout pregnancy to assess response to therapy.  Orders were given for follow-up CBC and iron studies today.    Follow up in about 2 weeks (around 7/11/2024) for MFM Followup, BPP, Monday.     No future appointments.     There is continued normal fetal growth.  We will continue weekly fetal testing with Dr. Neely and will start twice weekly fetal testing after 32 weeks.  Iron studies and follow-up CBC was ordered with a hematinic therapy started.  No depression symptoms continue BuSpar.    Patient was evaluated and examined by Dr. Cannon. ELENA Martin, helped in pre charting of part of note.    This note was created with the assistance of [employer] voice recognition software. There may be transcription errors as a result of using this technology, however minimal. Effort has been made to assure accuracy of transcription, but any obvious errors or omissions should be clarified with the author of the document.

## 2024-06-27 ENCOUNTER — PROCEDURE VISIT (OUTPATIENT)
Dept: MATERNAL FETAL MEDICINE | Facility: CLINIC | Age: 24
End: 2024-06-27
Payer: MEDICAID

## 2024-06-27 ENCOUNTER — OFFICE VISIT (OUTPATIENT)
Dept: MATERNAL FETAL MEDICINE | Facility: CLINIC | Age: 24
End: 2024-06-27
Payer: MEDICAID

## 2024-06-27 VITALS
WEIGHT: 196 LBS | BODY MASS INDEX: 32.65 KG/M2 | HEART RATE: 83 BPM | DIASTOLIC BLOOD PRESSURE: 71 MMHG | HEIGHT: 65 IN | SYSTOLIC BLOOD PRESSURE: 128 MMHG

## 2024-06-27 DIAGNOSIS — O99.013 ANEMIA AFFECTING PREGNANCY IN THIRD TRIMESTER: Primary | ICD-10-CM

## 2024-06-27 DIAGNOSIS — B00.9 HERPES SIMPLEX VIRUS TYPE 2 (HSV-2) INFECTION AFFECTING PREGNANCY IN SECOND TRIMESTER: ICD-10-CM

## 2024-06-27 DIAGNOSIS — O99.213 OBESITY AFFECTING PREGNANCY IN THIRD TRIMESTER, UNSPECIFIED OBESITY TYPE: ICD-10-CM

## 2024-06-27 DIAGNOSIS — O09.293 PRIOR PREGNANCY WITH FETAL DEMISE AND CURRENT PREGNANCY IN THIRD TRIMESTER: ICD-10-CM

## 2024-06-27 DIAGNOSIS — F32.A DEPRESSION DURING PREGNANCY IN THIRD TRIMESTER: ICD-10-CM

## 2024-06-27 DIAGNOSIS — O09.90 AT HIGH RISK FOR COMPLICATIONS OF INTRAUTERINE PREGNANCY (IUP): ICD-10-CM

## 2024-06-27 DIAGNOSIS — O99.212 OBESITY AFFECTING PREGNANCY IN SECOND TRIMESTER, UNSPECIFIED OBESITY TYPE: ICD-10-CM

## 2024-06-27 DIAGNOSIS — O09.299 HX OF INTRAUTERINE GROWTH RESTRICTION IN PRIOR PREGNANCY, CURRENTLY PREGNANT: ICD-10-CM

## 2024-06-27 DIAGNOSIS — O99.342 DEPRESSION DURING PREGNANCY IN SECOND TRIMESTER: ICD-10-CM

## 2024-06-27 DIAGNOSIS — O98.513 HERPES SIMPLEX VIRUS TYPE 2 (HSV-2) INFECTION AFFECTING PREGNANCY IN THIRD TRIMESTER: ICD-10-CM

## 2024-06-27 DIAGNOSIS — O09.292 PRIOR PREGNANCY WITH FETAL DEMISE AND CURRENT PREGNANCY IN SECOND TRIMESTER: ICD-10-CM

## 2024-06-27 DIAGNOSIS — B00.9 HERPES SIMPLEX VIRUS TYPE 2 (HSV-2) INFECTION AFFECTING PREGNANCY IN THIRD TRIMESTER: ICD-10-CM

## 2024-06-27 DIAGNOSIS — F32.A DEPRESSION DURING PREGNANCY IN SECOND TRIMESTER: ICD-10-CM

## 2024-06-27 DIAGNOSIS — O99.343 DEPRESSION DURING PREGNANCY IN THIRD TRIMESTER: ICD-10-CM

## 2024-06-27 DIAGNOSIS — O98.512 HERPES SIMPLEX VIRUS TYPE 2 (HSV-2) INFECTION AFFECTING PREGNANCY IN SECOND TRIMESTER: ICD-10-CM

## 2024-06-27 PROCEDURE — 1159F MED LIST DOCD IN RCRD: CPT | Mod: CPTII,S$GLB,, | Performed by: OBSTETRICS & GYNECOLOGY

## 2024-06-27 PROCEDURE — 3074F SYST BP LT 130 MM HG: CPT | Mod: CPTII,S$GLB,, | Performed by: OBSTETRICS & GYNECOLOGY

## 2024-06-27 PROCEDURE — 99213 OFFICE O/P EST LOW 20 MIN: CPT | Mod: TH,S$GLB,, | Performed by: OBSTETRICS & GYNECOLOGY

## 2024-06-27 PROCEDURE — 3078F DIAST BP <80 MM HG: CPT | Mod: CPTII,S$GLB,, | Performed by: OBSTETRICS & GYNECOLOGY

## 2024-06-27 PROCEDURE — 3008F BODY MASS INDEX DOCD: CPT | Mod: CPTII,S$GLB,, | Performed by: OBSTETRICS & GYNECOLOGY

## 2024-06-27 PROCEDURE — 1160F RVW MEDS BY RX/DR IN RCRD: CPT | Mod: CPTII,S$GLB,, | Performed by: OBSTETRICS & GYNECOLOGY

## 2024-06-27 PROCEDURE — 76816 OB US FOLLOW-UP PER FETUS: CPT | Mod: S$GLB,,, | Performed by: OBSTETRICS & GYNECOLOGY

## 2024-06-27 PROCEDURE — 76819 FETAL BIOPHYS PROFIL W/O NST: CPT | Mod: S$GLB,,, | Performed by: OBSTETRICS & GYNECOLOGY

## 2024-06-27 RX ORDER — ASCORBIC ACID 250 MG
250 TABLET ORAL EVERY OTHER DAY
Qty: 20 TABLET | Refills: 3 | Status: SHIPPED | OUTPATIENT
Start: 2024-06-27

## 2024-06-27 RX ORDER — FOLIC ACID 1 MG/1
1 TABLET ORAL DAILY
Qty: 30 TABLET | Refills: 3 | Status: SHIPPED | OUTPATIENT
Start: 2024-06-27 | End: 2024-10-25

## 2024-06-27 RX ORDER — FERROUS SULFATE 325(65) MG
325 TABLET ORAL EVERY OTHER DAY
Qty: 20 TABLET | Refills: 3 | Status: SHIPPED | OUTPATIENT
Start: 2024-06-27

## 2024-07-04 ENCOUNTER — HOSPITAL ENCOUNTER (OUTPATIENT)
Facility: HOSPITAL | Age: 24
Discharge: HOME | End: 2024-07-05
Attending: OBSTETRICS & GYNECOLOGY | Admitting: OBSTETRICS & GYNECOLOGY
Payer: MEDICAID

## 2024-07-04 DIAGNOSIS — O47.00 PRETERM CONTRACTIONS: ICD-10-CM

## 2024-07-04 PROCEDURE — 99285 EMERGENCY DEPT VISIT HI MDM: CPT

## 2024-07-05 VITALS
SYSTOLIC BLOOD PRESSURE: 107 MMHG | TEMPERATURE: 98 F | HEIGHT: 65 IN | DIASTOLIC BLOOD PRESSURE: 60 MMHG | HEART RATE: 80 BPM | BODY MASS INDEX: 32.65 KG/M2 | OXYGEN SATURATION: 99 % | WEIGHT: 196 LBS | RESPIRATION RATE: 16 BRPM

## 2024-07-05 DIAGNOSIS — O09.293 PRIOR PREGNANCY WITH FETAL DEMISE AND CURRENT PREGNANCY IN THIRD TRIMESTER: Primary | ICD-10-CM

## 2024-07-05 DIAGNOSIS — O99.213 OBESITY AFFECTING PREGNANCY IN THIRD TRIMESTER, UNSPECIFIED OBESITY TYPE: ICD-10-CM

## 2024-07-05 DIAGNOSIS — O99.013 ANEMIA AFFECTING PREGNANCY IN THIRD TRIMESTER: ICD-10-CM

## 2024-07-05 PROBLEM — O26.893 ABDOMINAL PAIN DURING PREGNANCY IN THIRD TRIMESTER: Status: ACTIVE | Noted: 2024-07-05

## 2024-07-05 PROBLEM — Z3A.31 PREGNANCY WITH 31 COMPLETED WEEKS GESTATION: Status: ACTIVE | Noted: 2024-07-05

## 2024-07-05 PROBLEM — R10.9 ABDOMINAL PAIN DURING PREGNANCY IN THIRD TRIMESTER: Status: ACTIVE | Noted: 2024-07-05

## 2024-07-05 LAB
BACTERIA #/AREA URNS AUTO: ABNORMAL /HPF
BILIRUB UR QL STRIP.AUTO: NEGATIVE
CLARITY UR: CLEAR
COLOR UR AUTO: YELLOW
GLUCOSE UR QL STRIP: NORMAL
HGB UR QL STRIP: NEGATIVE
HYALINE CASTS #/AREA URNS LPF: ABNORMAL /LPF
KETONES UR QL STRIP: ABNORMAL
LEUKOCYTE ESTERASE UR QL STRIP: 250
MUCOUS THREADS URNS QL MICRO: ABNORMAL /LPF
NITRITE UR QL STRIP: NEGATIVE
PH UR STRIP: 6.5 [PH]
PROT UR QL STRIP: ABNORMAL
RBC #/AREA URNS AUTO: ABNORMAL /HPF
SP GR UR STRIP.AUTO: 1.02 (ref 1–1.03)
SQUAMOUS #/AREA URNS LPF: ABNORMAL /HPF
UROBILINOGEN UR STRIP-ACNC: NORMAL
WBC #/AREA URNS AUTO: ABNORMAL /HPF

## 2024-07-05 PROCEDURE — 81001 URINALYSIS AUTO W/SCOPE: CPT | Performed by: OBSTETRICS & GYNECOLOGY

## 2024-07-05 PROCEDURE — G0378 HOSPITAL OBSERVATION PER HR: HCPCS

## 2024-07-05 NOTE — H&P
[facility] - Antepartum  Obstetrics  [facility] History & Physical    Patient Name: Demond Phipps  MRN: [id number]  Admission Date: 2024  Primary Care Provider: Annita Mejia FNP    Subjective:     Principal Problem:Abdominal pain during pregnancy in third trimester    History of Present Illness:  23 yro  @ 31 wk 5 d presenting to [facility] via EMS for abd pain and cramping.  Pt states no prior complications with this pregnancy.  Good FM, no LOF, no VB.    Past Medical History:   Diagnosis Date    Anemia 2019    for this pregnancy    Anxiety disorder, unspecified 2018    current sometimes    Asthma 1998    not current    Herpes simplex virus (HSV) infection 2018    Panic attacks     not currently    Postpartum depression     not current     History reviewed. No pertinent surgical history.      Obstetric HPI:  Patient reports None contractions, active fetal movement, No vaginal bleeding , No loss of fluid     This pregnancy has been complicated by hx of prior pregnancy with fetal demise.    OB History    Para Term  AB Living   5 4 3 1 0 3   SAB IAB Ectopic Multiple Live Births   0 0 0 0 3      # Outcome Date GA Lbr Lebron/2nd Weight Sex Type Anes PTL Lv   5 Current            4 Term 21 37w0d  2.325 kg (5 lb 2 oz) M Vag-Spont EPI N MIYA   3 Term 20 37w0d  2.722 kg (6 lb) F Vag-Spont EPI N MIYA   2 Term 19 37w0d  3.09 kg (6 lb 13 oz) M Vag-Spont EPI N MIYA   1  2018 24w0d   F   Y FD      Birth Comments: [facility]     Past Medical History:   Diagnosis Date    Anemia 2019    for this pregnancy    Anxiety disorder, unspecified 2018    current sometimes    Asthma 1998    not current    Herpes simplex virus (HSV) infection 2018    Panic attacks     not currently    Postpartum depression     not current     History reviewed. No pertinent surgical history.    PTA Medications   Medication Sig    ascorbic acid, vitamin C, (VITAMIN C) 250 MG  tablet Take 1 tablet (250 mg total) by mouth every other day.    aspirin (ECOTRIN) 81 MG EC tablet Take 81 mg by mouth once daily.    ferrous sulfate (FEOSOL) 325 mg (65 mg iron) Tab tablet Take 1 tablet (325 mg total) by mouth every other day.    folic acid (FOLVITE) 1 MG tablet Take 1 tablet (1 mg total) by mouth once daily.    prenatal vit/iron fum/folic ac (PRENATAL 1+1 ORAL) Take by mouth.    sertraline (ZOLOFT) 25 MG tablet Take 25 mg by mouth once daily.       Review of patient's allergies indicates:   Allergen Reactions    Amoxicillin Hives        Family History       Problem Relation (Age of Onset)    Hypertension Mother    Miscarriages / Stillbirths Paternal Grandmother, Maternal Grandmother    Seizures Maternal Grandmother          Tobacco Use    Smoking status: Never     Passive exposure: Never    Smokeless tobacco: Never   Substance and Sexual Activity    Alcohol use: Not Currently     Comment: twice a month/socially    Drug use: Not Currently    Sexual activity: Yes     Partners: Male     Review of Systems   Constitutional: Negative.    Respiratory: Negative.     Cardiovascular: Negative.    Gastrointestinal: Negative.    Endocrine: Negative.    Genitourinary: Negative.    Musculoskeletal: Negative.    Neurological: Negative.    Hematological: Negative.    Psychiatric/Behavioral: Negative.     Breast: negative.       Objective:     Vital Signs (Most Recent):  Temp: 97.2 °F (36.2 °C) (07/05/24 0325)  Pulse: 72 (07/05/24 0325)  Resp: 18 (07/05/24 0325)  BP: (!) 107/59 (07/05/24 0325)  SpO2: 97 % (07/05/24 0017) Vital Signs (24h Range):  Temp:  [97.2 °F (36.2 °C)-98.9 °F (37.2 °C)] 97.2 °F (36.2 °C)  Pulse:  [72-93] 72  Resp:  [16-18] 18  SpO2:  [97 %] 97 %  BP: (107-119)/(59-71) 107/59     Weight: 88.9 kg (196 lb)  Body mass index is 32.62 kg/m².    FHT: 120, reactive Cat 1 (reassuring)  TOCO:  none     Physical Exam:   Constitutional: She is oriented to person, place, and time. She appears  well-developed and well-nourished. No distress.    HENT:   Head: Normocephalic and atraumatic.     Neck: No thyromegaly present.     Pulmonary/Chest: Effort normal. No respiratory distress.        Abdominal: Soft. She exhibits no distension and no mass. There is no abdominal tenderness. There is no rebound and no guarding.             Musculoskeletal: Normal range of motion and moves all extremeties. No tenderness.       Neurological: She is alert and oriented to person, place, and time. No cranial nerve deficit. Coordination normal.    Skin: She is not diaphoretic.    Psychiatric: She has a normal mood and affect. Her behavior is normal. Judgment and thought content normal.        Cervix:  Dilation:  1  Effacement:  50%  Station: -3  Presentation: unsure     Significant Labs:  Lab Results   Component Value Date    GROUPTRH A POS 2021    HEPBSAG Negative 2018       I have personallly reviewed all pertinent lab results from the last 24 hours.  Assessment/Plan:     23 y.o. female  at 31w5d for:    * Abdominal pain during pregnancy in third trimester  Pain resolved, Maternal and fetal status reassuring, pt d/c'd in good condition; f/u 1 wk with primary OB    Pregnancy with 31 completed weeks gestation  Maternal and fetal status reassuring, pt d/c'd in good condition; f/u 1 wk with primary OB    Prior pregnancy with fetal demise and current pregnancy in third trimester  Maternal and fetal status reassuring, pt d/c'd in good condition; f/u 1 wk with primary OB        Volodymyr Whaley MD  Obstetrics  [facility] - Antepartum

## 2024-07-05 NOTE — ASSESSMENT & PLAN NOTE
Pain resolved, Maternal and fetal status reassuring, pt d/c'd in good condition; f/u 1 wk with primary OB

## 2024-07-05 NOTE — SUBJECTIVE & OBJECTIVE
Obstetric HPI:  Patient reports None contractions, active fetal movement, No vaginal bleeding , No loss of fluid     This pregnancy has been complicated by hx of prior pregnancy with fetal demise.    OB History    Para Term  AB Living   5 4 3 1 0 3   SAB IAB Ectopic Multiple Live Births   0 0 0 0 3      # Outcome Date GA Lbr Lebron/2nd Weight Sex Type Anes PTL Lv   5 Current            4 Term 21 37w0d  2.325 kg (5 lb 2 oz) M Vag-Spont EPI N MIYA   3 Term 20 37w0d  2.722 kg (6 lb) F Vag-Spont EPI N MIYA   2 Term 19 37w0d  3.09 kg (6 lb 13 oz) M Vag-Spont EPI N MIYA   1  2018 24w0d   F   Y FD      Birth Comments: [facility]     Past Medical History:   Diagnosis Date    Anemia     for this pregnancy    Anxiety disorder, unspecified     current sometimes    Asthma     not current    Herpes simplex virus (HSV) infection     Panic attacks     not currently    Postpartum depression     not current     History reviewed. No pertinent surgical history.    PTA Medications   Medication Sig    ascorbic acid, vitamin C, (VITAMIN C) 250 MG tablet Take 1 tablet (250 mg total) by mouth every other day.    aspirin (ECOTRIN) 81 MG EC tablet Take 81 mg by mouth once daily.    ferrous sulfate (FEOSOL) 325 mg (65 mg iron) Tab tablet Take 1 tablet (325 mg total) by mouth every other day.    folic acid (FOLVITE) 1 MG tablet Take 1 tablet (1 mg total) by mouth once daily.    prenatal vit/iron fum/folic ac (PRENATAL 1+1 ORAL) Take by mouth.    sertraline (ZOLOFT) 25 MG tablet Take 25 mg by mouth once daily.       Review of patient's allergies indicates:   Allergen Reactions    Amoxicillin Hives        Family History       Problem Relation (Age of Onset)    Hypertension Mother    Miscarriages / Stillbirths Paternal Grandmother, Maternal Grandmother    Seizures Maternal Grandmother          Tobacco Use    Smoking status: Never     Passive exposure: Never    Smokeless tobacco:  Never   Substance and Sexual Activity    Alcohol use: Not Currently     Comment: twice a month/socially    Drug use: Not Currently    Sexual activity: Yes     Partners: Male     Review of Systems   Constitutional: Negative.    Respiratory: Negative.     Cardiovascular: Negative.    Gastrointestinal: Negative.    Endocrine: Negative.    Genitourinary: Negative.    Musculoskeletal: Negative.    Neurological: Negative.    Hematological: Negative.    Psychiatric/Behavioral: Negative.     Breast: negative.       Objective:     Vital Signs (Most Recent):  Temp: 97.2 °F (36.2 °C) (07/05/24 0325)  Pulse: 72 (07/05/24 0325)  Resp: 18 (07/05/24 0325)  BP: (!) 107/59 (07/05/24 0325)  SpO2: 97 % (07/05/24 0017) Vital Signs (24h Range):  Temp:  [97.2 °F (36.2 °C)-98.9 °F (37.2 °C)] 97.2 °F (36.2 °C)  Pulse:  [72-93] 72  Resp:  [16-18] 18  SpO2:  [97 %] 97 %  BP: (107-119)/(59-71) 107/59     Weight: 88.9 kg (196 lb)  Body mass index is 32.62 kg/m².    FHT: 120, reactive Cat 1 (reassuring)  TOCO:  none     Physical Exam:   Constitutional: She is oriented to person, place, and time. She appears well-developed and well-nourished. No distress.    HENT:   Head: Normocephalic and atraumatic.     Neck: No thyromegaly present.     Pulmonary/Chest: Effort normal. No respiratory distress.        Abdominal: Soft. She exhibits no distension and no mass. There is no abdominal tenderness. There is no rebound and no guarding.             Musculoskeletal: Normal range of motion and moves all extremeties. No tenderness.       Neurological: She is alert and oriented to person, place, and time. No cranial nerve deficit. Coordination normal.    Skin: She is not diaphoretic.    Psychiatric: She has a normal mood and affect. Her behavior is normal. Judgment and thought content normal.        Cervix:  Dilation:  1  Effacement:  50%  Station: -3  Presentation: unsure     Significant Labs:  Lab Results   Component Value Date    [facility] POS 09/09/2021     HEPBSAG Negative 08/25/2018       I have personallly reviewed all pertinent lab results from the last 24 hours.

## 2024-07-05 NOTE — HPI
23 yro  @ 31 wk 5 d presenting to [facility] via EMS for abd pain and cramping.  Pt states no prior complications with this pregnancy.  Good FM, no LOF, no VB.    Past Medical History:   Diagnosis Date    Anemia     for this pregnancy    Anxiety disorder, unspecified     current sometimes    Asthma     not current    Herpes simplex virus (HSV) infection     Panic attacks     not currently    Postpartum depression     not current     History reviewed. No pertinent surgical history.

## 2024-07-05 NOTE — HOSPITAL COURSE
During initial evaluation, fetus noted to have spontaneous deceleration lasting 4 minutes with spontaneous resolution.  Pt then placed in extended observation and fetal status had been completely reassuring.  Pt felt improved after sleeping several hours.  No other interventions done.

## 2024-07-08 ENCOUNTER — TELEPHONE (OUTPATIENT)
Dept: MATERNAL FETAL MEDICINE | Facility: CLINIC | Age: 24
End: 2024-07-08
Payer: MEDICAID

## 2024-07-08 NOTE — TELEPHONE ENCOUNTER
Patient called and said she was kept overnight at [facility]. Patient said she went in with period like cramps and was put on a monitor. She asked if she needed to be seen by Dr. Cannon sooner. Dr. Cannon reviewed her chart, and said she can keep the appointment scheduled on 07/11/24, but should go in to see Dr. Neely today and be put on the monitor. I explained that to the patient, and offered to call the office for her, as she said she already had a message put into Dr. Neely nurse. Susan at Dr. Neely office scheduled the patient for 1:45 today for a nst. Patient given the appointment time, however she said she will call back to reschedule it to tomorrow. I explained that Dr. Cannon would prefer she be seen today, but patient said there is no way for her to have a nst today.

## 2024-07-10 PROBLEM — O26.893 ABDOMINAL PAIN DURING PREGNANCY IN THIRD TRIMESTER: Status: RESOLVED | Noted: 2024-07-05 | Resolved: 2024-07-10

## 2024-07-10 PROBLEM — R10.9 ABDOMINAL PAIN DURING PREGNANCY IN THIRD TRIMESTER: Status: RESOLVED | Noted: 2024-07-05 | Resolved: 2024-07-10

## 2024-07-10 PROBLEM — Z3A.31 PREGNANCY WITH 31 COMPLETED WEEKS GESTATION: Status: RESOLVED | Noted: 2024-07-05 | Resolved: 2024-07-10

## 2024-07-10 NOTE — PROGRESS NOTES
Maternal Fetal Medicine Follow Up    Subjective:     Patient ID: [id number]    Chief Complaint: high risk pregnancy followup      HPI: Demond Phipps is a 23 y.o. female  at 32w4d gestation with Estimated Date of Delivery: 24  who is here for follow-up consultation by [facility].    She has history of FDIU at 24 weeks in her 1st pregnancy and also had early fetal growth restriction in that pregnancy.  She reports she had no symptoms and went in for an OB visit and had no heartbeat seen.  She had negative APS testing during her last pregnancy.  She had recurrent fetal growth restriction in her last 2 pregnancies.  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  She had increased BMI of 32.2 at initial MFM consultation visit.  She is on low-dose aspirin daily.  She has history of anxiety and depression and is currently on sertraline 25mg daily, feeling well at this time. She denies any SI/HI.  She was noted to be anemic with H&H 9.3/30.2 on 2024.  She is on oral hematinic therapy.  Follow-up CBC and iron studies were ordered at last visit but she has not done them yet.    patient reported that she went on  to the hospital with contractions and false labor diagnosis made.  She was discharged after evaluation       Interval history since last M visit: None.. She denies any leaking fluid, vaginal bleeding, contractions, decreased fetal movement. Denies headaches, visual disturbances, or epigastric pain.    Pregnancy complications include:   Patient Active Problem List   Diagnosis    Prior pregnancy with fetal demise and current pregnancy in third trimester    Hx of intrauterine growth restriction in prior pregnancy, currently pregnant    Herpes simplex virus type 2 (HSV-2) infection affecting pregnancy in third trimester    At high risk for complications of intrauterine pregnancy (IUP)    Increased BMI affecting pregnancy in third trimester    Depression during pregnancy in third trimester     "Anxiety    Anemia affecting pregnancy in third trimester       No changes to medical, surgical, family, social, or obstetric history.    Medications:  Current Outpatient Medications   Medication Instructions    ascorbic acid (vitamin C) (VITAMIN C) 250 mg, Oral, Every other day    aspirin (ECOTRIN) 81 mg, Oral, Daily    ferrous sulfate (FEOSOL) 325 mg, Oral, Every other day    folic acid (FOLVITE) 1 mg, Oral, Daily    prenatal vit/iron fum/folic ac (PRENATAL 1+1 ORAL) Oral    sertraline (ZOLOFT) 25 mg, Oral, Daily       Review of Systems   12 point review of systems conducted, negative except as stated in the history of present illness. See HPI for details.      Objective:     Visit Vitals  /71 (BP Location: Left arm, Patient Position: Sitting, BP Method: Large (Automatic))   Pulse 80   Ht 5' 5" (1.651 m)   Wt 88.4 kg (194 lb 12.8 oz)   LMP  (LMP Unknown)   BMI 32.42 kg/m²        Physical Exam  Vitals and nursing note reviewed.   Constitutional:       Appearance: Normal appearance.   HENT:      Head: Normocephalic and atraumatic.   Cardiovascular:      Rate and Rhythm: Normal rate and regular rhythm.   Pulmonary:      Effort: Pulmonary effort is normal. No respiratory distress.      Breath sounds: Normal breath sounds.   Abdominal:      Palpations: Abdomen is soft.      Tenderness: There is no abdominal tenderness.   Musculoskeletal:      Right lower leg: No edema.      Left lower leg: No edema.   Neurological:      Mental Status: She is alert and oriented to person, place, and time.      Deep Tendon Reflexes: Reflexes normal.   Psychiatric:         Mood and Affect: Mood normal.         Behavior: Behavior normal.         Thought Content: Thought content normal.         Judgment: Judgment normal.         Assessment/Plan:     23 y.o.  female with IUP at 32w4d    History of fetal demise at 24 weeks in previous pregnancy  There is normal fetal growth with an EFW of 1818 g at the 51% and the AC at the 56% on " 24.   AFV is normal. BPP is 8/8 today (2024).     She had Negative APS testing    We have suggested monitoring fetal growth every 4 weeks. Recommend fetal testing to be done twice weekly at 32 weeks and continued until delivery, to alternate with Primary OB.    Different opinions about optimal timing of delivery exist, in view of lack of data to support a specific approach, with consideration for delivery at 38-39 weeks. Discussed risks/benefits of both options at previous visit, and patient has decided to deliver at 38 weeks, understanding R/B both options.    Reviewed instructions to report any significant cramping or any vaginal bleeding.   Reviewed importance of FKC 3/day and prn with instructions to immediately report any decreased fetal movement.      History of fetal growth restriction in 2 previous pregnancies  There is potential risk of recurrence of growth restriction in subsequent pregnancies.  We will plan to monitor fetal growth as above.        History of HSV 2 infection in pregnancy  A  section should be done for active lesions or prodromal symptoms in labor or PROM near term.     Recommend start suppressive treatment with 500mg of Valtrex BID OR acyclovir 400 mg three times a day, around 34-35 weeks gestation until delivery.        Invasive monitoring in labor could increase risk of  transmission by 6 fold. However, if clear indication for fetal scalp monitoring is present, it could reasonably be done in patient with no recurrent disease with no visible lesions.       At high risk for preeclampsia  With her increased risk for preeclampsia, she agreed to continue asa 81 mg daily until delivery.. Preeclampsia precautions reviewed.       Increased BMI in pregnancy  Body mass index is 32.42 kg/m². With relatively stable weight recently, she was advised to follow a healthy diet.  Excess weight gain would be associated with gestational hypertension, gestational diabetes and  adverse  outcomes, including fetal demise in utero.    Reviewed importance of FKC 3/day and prn with instructions to immediately report any decreased fetal movement.    It is important to lose weight after the pregnancy is over, especially before a future pregnancy. Breastfeeding may be an important tool in reducing the postpartum weight retention. Fetal risks were discussed with short term risk of fetal/ obesity and long term risk of adolescent component of metabolic syndrome.      Depression and anxiety in pregnancy  With good symptom control, reasonable to continue sertraline 25 mg daily at this time. She was also advised to report any worsening of symptoms or SI/HI tendencies immediately to provider/ER for prompt intervention. Keep followup care as scheduled with a mental health provider.        Anemia in pregnancy  Anemia in pregnancy is associated with a 2-fold increased risk for  delivery and 3-fold increased risk for delivering a low birth weight baby.      Continue oral hematinic therapy. Recommend intermittent H/H throughout the pregnancy to assess response to supplementation and guide titration of dosing.   Reminded to do follow-up CBC and iron studies today.      Patient will have testing the hospital next week and will see her back in 2 weeks in the office we will plan to rechecked growth.  Emphasized importance of doing follow-up iron studies.  Fetal kick count instructions and preeclampsia warnings.    Follow up in about 2 weeks (around 2024).     Future Appointments   Date Time Provider Department Center   2024 11:00 AM ROOM 1, [facility]   2024 11:30 AM Madelyn Mireles PA-C [facility]     Patient was evaluated and examined by Dr. Cannon. ELENA Martin, helped in pre charting of part of note.    This note was created with the assistance of [employer] voice recognition software. There may be transcription errors as a result of using  this technology, however minimal. Effort has been made to assure accuracy of transcription, but any obvious errors or omissions should be clarified with the author of the document.

## 2024-07-11 ENCOUNTER — OFFICE VISIT (OUTPATIENT)
Dept: MATERNAL FETAL MEDICINE | Facility: CLINIC | Age: 24
End: 2024-07-11
Payer: MEDICAID

## 2024-07-11 ENCOUNTER — PROCEDURE VISIT (OUTPATIENT)
Dept: MATERNAL FETAL MEDICINE | Facility: CLINIC | Age: 24
End: 2024-07-11
Payer: MEDICAID

## 2024-07-11 VITALS
BODY MASS INDEX: 32.46 KG/M2 | DIASTOLIC BLOOD PRESSURE: 71 MMHG | WEIGHT: 194.81 LBS | HEART RATE: 80 BPM | HEIGHT: 65 IN | SYSTOLIC BLOOD PRESSURE: 106 MMHG

## 2024-07-11 DIAGNOSIS — O99.013 ANEMIA AFFECTING PREGNANCY IN THIRD TRIMESTER: ICD-10-CM

## 2024-07-11 DIAGNOSIS — O09.293 PRIOR PREGNANCY WITH FETAL DEMISE AND CURRENT PREGNANCY IN THIRD TRIMESTER: ICD-10-CM

## 2024-07-11 DIAGNOSIS — O09.299 HX OF INTRAUTERINE GROWTH RESTRICTION IN PRIOR PREGNANCY, CURRENTLY PREGNANT: ICD-10-CM

## 2024-07-11 DIAGNOSIS — O98.513 HERPES SIMPLEX VIRUS TYPE 2 (HSV-2) INFECTION AFFECTING PREGNANCY IN THIRD TRIMESTER: Primary | ICD-10-CM

## 2024-07-11 DIAGNOSIS — O99.343 DEPRESSION DURING PREGNANCY IN THIRD TRIMESTER: ICD-10-CM

## 2024-07-11 DIAGNOSIS — F32.A DEPRESSION DURING PREGNANCY IN THIRD TRIMESTER: ICD-10-CM

## 2024-07-11 DIAGNOSIS — B00.9 HERPES SIMPLEX VIRUS TYPE 2 (HSV-2) INFECTION AFFECTING PREGNANCY IN THIRD TRIMESTER: Primary | ICD-10-CM

## 2024-07-11 DIAGNOSIS — O99.213 OBESITY AFFECTING PREGNANCY IN THIRD TRIMESTER, UNSPECIFIED OBESITY TYPE: ICD-10-CM

## 2024-07-11 DIAGNOSIS — O09.90 AT HIGH RISK FOR COMPLICATIONS OF INTRAUTERINE PREGNANCY (IUP): ICD-10-CM

## 2024-07-11 PROCEDURE — 99213 OFFICE O/P EST LOW 20 MIN: CPT | Mod: TH,S$GLB,, | Performed by: OBSTETRICS & GYNECOLOGY

## 2024-07-11 PROCEDURE — 3074F SYST BP LT 130 MM HG: CPT | Mod: CPTII,S$GLB,, | Performed by: OBSTETRICS & GYNECOLOGY

## 2024-07-11 PROCEDURE — 76816 OB US FOLLOW-UP PER FETUS: CPT | Mod: S$GLB,,, | Performed by: OBSTETRICS & GYNECOLOGY

## 2024-07-11 PROCEDURE — 3078F DIAST BP <80 MM HG: CPT | Mod: CPTII,S$GLB,, | Performed by: OBSTETRICS & GYNECOLOGY

## 2024-07-11 PROCEDURE — 1160F RVW MEDS BY RX/DR IN RCRD: CPT | Mod: CPTII,S$GLB,, | Performed by: OBSTETRICS & GYNECOLOGY

## 2024-07-11 PROCEDURE — 76819 FETAL BIOPHYS PROFIL W/O NST: CPT | Mod: S$GLB,,, | Performed by: OBSTETRICS & GYNECOLOGY

## 2024-07-11 PROCEDURE — 1159F MED LIST DOCD IN RCRD: CPT | Mod: CPTII,S$GLB,, | Performed by: OBSTETRICS & GYNECOLOGY

## 2024-07-11 PROCEDURE — 3008F BODY MASS INDEX DOCD: CPT | Mod: CPTII,S$GLB,, | Performed by: OBSTETRICS & GYNECOLOGY

## 2024-07-23 DIAGNOSIS — O09.293 PRIOR PREGNANCY WITH FETAL DEMISE AND CURRENT PREGNANCY IN THIRD TRIMESTER: ICD-10-CM

## 2024-07-23 DIAGNOSIS — O99.213 OBESITY AFFECTING PREGNANCY IN THIRD TRIMESTER, UNSPECIFIED OBESITY TYPE: ICD-10-CM

## 2024-07-23 DIAGNOSIS — O99.013 ANEMIA AFFECTING PREGNANCY IN THIRD TRIMESTER: Primary | ICD-10-CM

## 2024-07-24 NOTE — PROGRESS NOTES
Maternal Fetal Medicine Follow Up    Subjective:     Patient ID: [id number]    Chief Complaint: high risk pregnancy followup      HPI: Demond Phipps is a 23 y.o. female  at 34w4d gestation with Estimated Date of Delivery: 24  who is here for follow-up consultation by M.    She has history of FDIU at 24 weeks in her 1st pregnancy and also had early fetal growth restriction in that pregnancy.  She reports she had no symptoms and went in for an OB visit and had no heartbeat seen.  She had negative APS testing during her last pregnancy.  She had recurrent fetal growth restriction in her last 2 pregnancies.  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  She had increased BMI of 32.2 at initial MFM consultation visit.  She is on low-dose aspirin daily.  She has history of anxiety and depression and is currently on sertraline 25mg daily, feeling well at this time. She denies any SI/HI.  She was noted to be anemic with H&H 9.3/30.2 on 2024.  She is on oral hematinic therapy.  Follow-up CBC and iron studies were ordered at previous visit but she has not done them yet.       Interval history since last MFM visit: None.. She denies any leaking fluid, vaginal bleeding, contractions, decreased fetal movement. Denies headaches, visual disturbances, or epigastric pain.    Pregnancy complications include:   Patient Active Problem List   Diagnosis    Prior pregnancy with fetal demise and current pregnancy in third trimester    Hx of intrauterine growth restriction in prior pregnancy, currently pregnant    Herpes simplex virus type 2 (HSV-2) infection affecting pregnancy in third trimester    At high risk for complications of intrauterine pregnancy (IUP)    Increased BMI affecting pregnancy in third trimester    Depression during pregnancy in third trimester    Anxiety    Anemia affecting pregnancy in third trimester       No changes to medical, surgical, family, social, or obstetric  "history.    Medications:  Current Outpatient Medications   Medication Instructions    ascorbic acid (vitamin C) (VITAMIN C) 250 mg, Oral, Every other day    aspirin (ECOTRIN) 81 mg, Oral, Daily    ferrous sulfate (FEOSOL) 325 mg, Oral, Every other day    folic acid (FOLVITE) 1 mg, Oral, Daily    prenatal vit/iron fum/folic ac (PRENATAL 1+1 ORAL) Oral    sertraline (ZOLOFT) 25 mg, Oral, Daily       Review of Systems   12 point review of systems conducted, negative except as stated in the history of present illness. See HPI for details.      Objective:     Visit Vitals  /66 (BP Location: Left arm, Patient Position: Sitting, BP Method: Large (Automatic))   Pulse 89   Ht 5' 5" (1.651 m)   Wt 89.4 kg (197 lb)   LMP  (LMP Unknown)   BMI 32.78 kg/m²        Physical Exam  Vitals and nursing note reviewed.   Constitutional:       Appearance: Normal appearance.   HENT:      Head: Normocephalic and atraumatic.   Cardiovascular:      Rate and Rhythm: Normal rate and regular rhythm.   Pulmonary:      Effort: Pulmonary effort is normal. No respiratory distress.      Breath sounds: Normal breath sounds.   Abdominal:      Palpations: Abdomen is soft.      Tenderness: There is no abdominal tenderness.   Musculoskeletal:      Right lower leg: No edema.      Left lower leg: No edema.   Neurological:      Mental Status: She is alert and oriented to person, place, and time.      Deep Tendon Reflexes: Reflexes normal.   Psychiatric:         Mood and Affect: Mood normal.         Behavior: Behavior normal.         Thought Content: Thought content normal.         Judgment: Judgment normal.         Assessment/Plan:     23 y.o.  female with IUP at 34w4d    History of fetal demise at 24 weeks in previous pregnancy  There is normal fetal growth with an EFW of 2610 g at the 39% and the AC at the 45% on 24.  AFV is normal. BPP is 8/8 today (2024).     She had Negative APS testing.    We have suggested monitoring fetal " growth every 4 weeks. Recommend fetal testing to be done twice weekly until delivery, to alternate with Primary OB.    Different opinions about optimal timing of delivery exist, in view of lack of data to support a specific approach, with consideration for delivery at 38-39 weeks. Discussed risks/benefits of both options at previous visit, and patient has decided to deliver at 38 weeks, understanding R/B both options.    Reviewed instructions to report any significant cramping or any vaginal bleeding.   Reviewed importance of FKC 3/day and prn with instructions to immediately report any decreased fetal movement.      History of fetal growth restriction in 2 previous pregnancies  There is potential risk of recurrence of growth restriction in subsequent pregnancies.  We will plan to monitor fetal growth as above.        History of HSV 2 infection in pregnancy  A  section should be done for active lesions or prodromal symptoms in labor or PROM near term.     Recommend start suppressive treatment with 500mg of Valtrex BID OR acyclovir 400 mg three times a day, around 34-35 weeks gestation until delivery.        Invasive monitoring in labor could increase risk of  transmission by 6 fold. However, if clear indication for fetal scalp monitoring is present, it could reasonably be done in patient with no recurrent disease with no visible lesions.       At high risk for preeclampsia  With her increased risk for preeclampsia, she agreed to continue asa 81 mg daily until delivery.. Preeclampsia precautions reviewed.       Increased BMI in pregnancy  Body mass index is 32.78 kg/m². With relatively stable weight recently, she was advised to follow a healthy diet.  Excess weight gain would be associated with gestational hypertension, gestational diabetes and adverse  outcomes, including fetal demise in utero.    Reviewed importance of FKC 3/day and prn with instructions to immediately report any decreased  fetal movement.    It is important to lose weight after the pregnancy is over, especially before a future pregnancy. Breastfeeding may be an important tool in reducing the postpartum weight retention. Fetal risks were discussed with short term risk of fetal/ obesity and long term risk of adolescent component of metabolic syndrome.      Depression and anxiety in pregnancy  With good symptom control, reasonable to continue sertraline 25 mg daily at this time. She was also advised to report any worsening of symptoms or SI/HI tendencies immediately to provider/ER for prompt intervention. Keep followup care as scheduled with a mental health provider.        Anemia in pregnancy  Anemia in pregnancy is associated with a 2-fold increased risk for  delivery and 3-fold increased risk for delivering a low birth weight baby.      Continue oral hematinic therapy. Recommend intermittent H/H throughout the pregnancy to assess response to supplementation and guide titration of dosing.   Reminded to do follow-up CBC and iron studies today.    Follow up in about 1 week (around 2024) for M follow-up, BPP.     Future Appointments   Date Time Provider Department Center   2024  1:30 PM ROOM 3, [facility]   2024  1:45 PM Miguel Cannon MD [facility]       Madelyn Mireles PA-C     This note was created with the assistance of [email or web address] voice recognition software. There may be transcription errors as a result of using this technology, however minimal. Effort has been made to assure accuracy of transcription, but any obvious errors or omissions should be clarified with the author of the document.

## 2024-07-25 ENCOUNTER — OFFICE VISIT (OUTPATIENT)
Dept: MATERNAL FETAL MEDICINE | Facility: CLINIC | Age: 24
End: 2024-07-25
Payer: MEDICAID

## 2024-07-25 ENCOUNTER — PROCEDURE VISIT (OUTPATIENT)
Dept: MATERNAL FETAL MEDICINE | Facility: CLINIC | Age: 24
End: 2024-07-25
Payer: MEDICAID

## 2024-07-25 VITALS
HEART RATE: 89 BPM | BODY MASS INDEX: 32.82 KG/M2 | DIASTOLIC BLOOD PRESSURE: 66 MMHG | WEIGHT: 197 LBS | SYSTOLIC BLOOD PRESSURE: 108 MMHG | HEIGHT: 65 IN

## 2024-07-25 DIAGNOSIS — O98.513 HERPES SIMPLEX VIRUS TYPE 2 (HSV-2) INFECTION AFFECTING PREGNANCY IN THIRD TRIMESTER: ICD-10-CM

## 2024-07-25 DIAGNOSIS — B00.9 HERPES SIMPLEX VIRUS TYPE 2 (HSV-2) INFECTION AFFECTING PREGNANCY IN THIRD TRIMESTER: ICD-10-CM

## 2024-07-25 DIAGNOSIS — O99.013 ANEMIA AFFECTING PREGNANCY IN THIRD TRIMESTER: ICD-10-CM

## 2024-07-25 DIAGNOSIS — F32.A DEPRESSION DURING PREGNANCY IN THIRD TRIMESTER: Primary | ICD-10-CM

## 2024-07-25 DIAGNOSIS — O99.213 OBESITY AFFECTING PREGNANCY IN THIRD TRIMESTER, UNSPECIFIED OBESITY TYPE: ICD-10-CM

## 2024-07-25 DIAGNOSIS — O99.343 DEPRESSION DURING PREGNANCY IN THIRD TRIMESTER: Primary | ICD-10-CM

## 2024-07-25 DIAGNOSIS — O09.293 PRIOR PREGNANCY WITH FETAL DEMISE AND CURRENT PREGNANCY IN THIRD TRIMESTER: ICD-10-CM

## 2024-07-25 PROCEDURE — 1159F MED LIST DOCD IN RCRD: CPT | Mod: CPTII,S$GLB,,

## 2024-07-25 PROCEDURE — 76816 OB US FOLLOW-UP PER FETUS: CPT | Mod: S$GLB,,, | Performed by: OBSTETRICS & GYNECOLOGY

## 2024-07-25 PROCEDURE — 76819 FETAL BIOPHYS PROFIL W/O NST: CPT | Mod: S$GLB,,, | Performed by: OBSTETRICS & GYNECOLOGY

## 2024-07-25 PROCEDURE — 3074F SYST BP LT 130 MM HG: CPT | Mod: CPTII,S$GLB,,

## 2024-07-25 PROCEDURE — 99213 OFFICE O/P EST LOW 20 MIN: CPT | Mod: TH,S$GLB,,

## 2024-07-25 PROCEDURE — 3008F BODY MASS INDEX DOCD: CPT | Mod: CPTII,S$GLB,,

## 2024-07-25 PROCEDURE — 1160F RVW MEDS BY RX/DR IN RCRD: CPT | Mod: CPTII,S$GLB,,

## 2024-07-25 PROCEDURE — 3078F DIAST BP <80 MM HG: CPT | Mod: CPTII,S$GLB,,

## 2024-07-29 ENCOUNTER — TELEPHONE (OUTPATIENT)
Dept: MATERNAL FETAL MEDICINE | Facility: CLINIC | Age: 24
End: 2024-07-29
Payer: MEDICAID

## 2024-07-31 DIAGNOSIS — B00.9 HERPES SIMPLEX VIRUS TYPE 2 (HSV-2) INFECTION AFFECTING PREGNANCY IN THIRD TRIMESTER: ICD-10-CM

## 2024-07-31 DIAGNOSIS — O09.293 PRIOR PREGNANCY WITH FETAL DEMISE AND CURRENT PREGNANCY IN THIRD TRIMESTER: ICD-10-CM

## 2024-07-31 DIAGNOSIS — F32.A DEPRESSION DURING PREGNANCY IN THIRD TRIMESTER: Primary | ICD-10-CM

## 2024-07-31 DIAGNOSIS — O99.343 DEPRESSION DURING PREGNANCY IN THIRD TRIMESTER: Primary | ICD-10-CM

## 2024-07-31 DIAGNOSIS — O99.013 ANEMIA AFFECTING PREGNANCY IN THIRD TRIMESTER: ICD-10-CM

## 2024-07-31 DIAGNOSIS — O98.513 HERPES SIMPLEX VIRUS TYPE 2 (HSV-2) INFECTION AFFECTING PREGNANCY IN THIRD TRIMESTER: ICD-10-CM

## 2024-07-31 NOTE — PROGRESS NOTES
Maternal Fetal Medicine Follow Up    Subjective:     Patient ID: [id number]    Chief Complaint: high risk pregnancy followup      HPI: Demond Phipps is a 23 y.o. female  at 35w4d gestation with Estimated Date of Delivery: 24  who is here for follow-up consultation by [facility].    She has history of FGR and FDIU at 24 weeks in her 1st pregnancy.  She had negative APS testing during her last pregnancy.  She had recurrent fetal growth restriction in her last 2 pregnancies.  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  Patient has not started Valtrex yet.   She had increased BMI of 32.2 at initial M consultation visit.  She is on low-dose aspirin daily.  She has history of anxiety and depression and is currently on sertraline 25mg daily, feeling well at this time. She denies any SI/HI.  She was noted to be anemic with H&H 9.3/30.2 on 2024.  She is on oral hematinic therapy.  Follow-up CBC and iron studies were ordered at previous visit but she has not done them yet.  Patient stated she will go today and do them.         Interval history since last M visit: None.. She denies any leaking fluid, vaginal bleeding, contractions, decreased fetal movement. Denies headaches, visual disturbances, or epigastric pain.    Pregnancy complications include:   Patient Active Problem List   Diagnosis    Prior pregnancy with fetal demise and current pregnancy in third trimester    Hx of intrauterine growth restriction in prior pregnancy, currently pregnant    Herpes simplex virus type 2 (HSV-2) infection affecting pregnancy in third trimester    At high risk for complications of intrauterine pregnancy (IUP)    Increased BMI affecting pregnancy in third trimester    Depression during pregnancy in third trimester    Anxiety    Anemia affecting pregnancy in third trimester       No changes to medical, surgical, family, social, or obstetric history.    Medications:  Current Outpatient Medications   Medication  "Instructions    ascorbic acid (vitamin C) (VITAMIN C) 250 mg, Oral, Every other day    aspirin (ECOTRIN) 81 mg, Oral, Daily    ferrous sulfate (FEOSOL) 325 mg, Oral, Every other day    folic acid (FOLVITE) 1 mg, Oral, Daily    prenatal vit/iron fum/folic ac (PRENATAL 1+1 ORAL) Oral    sertraline (ZOLOFT) 25 mg, Oral, Daily    valACYclovir (VALTREX) 500 mg, Oral, 2 times daily       Review of Systems   12 point review of systems conducted, negative except as stated in the history of present illness. See HPI for details.      Objective:     Visit Vitals  /69 (BP Location: Left arm, Patient Position: Sitting, BP Method: Large (Automatic))   Pulse 76   Ht 5' 5" (1.651 m)   Wt 90.3 kg (199 lb)   LMP  (LMP Unknown)   BMI 33.12 kg/m²        Physical Exam  Vitals and nursing note reviewed.   Constitutional:       Appearance: Normal appearance.   HENT:      Head: Normocephalic and atraumatic.   Cardiovascular:      Rate and Rhythm: Normal rate and regular rhythm.   Pulmonary:      Effort: Pulmonary effort is normal. No respiratory distress.      Breath sounds: Normal breath sounds.   Abdominal:      Palpations: Abdomen is soft.      Tenderness: There is no abdominal tenderness.   Musculoskeletal:      Right lower leg: No edema.      Left lower leg: No edema.   Neurological:      Mental Status: She is alert and oriented to person, place, and time.      Deep Tendon Reflexes: Reflexes normal.   Psychiatric:         Mood and Affect: Mood normal.         Behavior: Behavior normal.         Thought Content: Thought content normal.         Judgment: Judgment normal.         Assessment/Plan:     23 y.o.  female with IUP at 35w4d    History of fetal demise at 24 weeks in previous pregnancy  There was normal fetal growth with an EFW of 2610 g at the 39% and the AC at the 45% on 24.  AFV is normal. BPP is 8/8 today (2024).     She had Negative APS testing.    We have suggested monitoring fetal growth every 4 " weeks. Recommend fetal testing to be done twice weekly until delivery, to alternate with Primary OB.    Different opinions about optimal timing of delivery exist, in view of lack of data to support a specific approach, with consideration for delivery at 38-39 weeks. Discussed risks/benefits of both options at previous visit, and patient has decided to deliver at 38 weeks, understanding R/B both options.    Reviewed instructions to report any significant cramping or any vaginal bleeding.   Reviewed importance of FKC 3/day and prn with instructions to immediately report any decreased fetal movement.      History of fetal growth restriction in 2 previous pregnancies  There is potential risk of recurrence of growth restriction in subsequent pregnancies.  Normal growth thus far.  We will plan to monitor fetal growth as above.        History of HSV 2 infection in pregnancy  Patient will start taking Valtrex 500 mg BID until delivery.   Prescription sent to the pharmacy today     Invasive monitoring in labor could increase risk of  transmission by 6 fold. However, if clear indication for fetal scalp monitoring is present, it could reasonably be done in patient with no recurrent disease with no visible lesions.   Recommend  section for any evidence of outbreak or any prodromal symptoms at or near time of delivery.       At high risk for preeclampsia  With her increased risk for preeclampsia, she agreed to continue asa 81 mg daily until delivery.. Preeclampsia precautions reviewed.       Increased BMI in pregnancy  Body mass index is 33.12 kg/m². With excessive weight gain from last visit, 5 lbs in 1 month , she was advised of the importance of following healthy diet avoiding any excessive weight gain.  Excess weight gain would be associated with gestational hypertension, gestational diabetes and adverse  outcomes, including fetal demise in utero.    Reviewed importance of FKC 3/day and prn with  instructions to immediately report any decreased fetal movement.    It is important to lose weight after the pregnancy is over, especially before a future pregnancy. Breastfeeding may be an important tool in reducing the postpartum weight retention. Fetal risks were discussed with short term risk of fetal/ obesity and long term risk of adolescent component of metabolic syndrome.      Depression and anxiety in pregnancy  With good symptom control, reasonable to continue sertraline 25 mg daily at this time. She was also advised to report any worsening of symptoms or SI/HI tendencies immediately to provider/ER for prompt intervention. Keep followup care as scheduled with a mental health provider.        Anemia in pregnancy  Anemia in pregnancy is associated with a 2-fold increased risk for  delivery and 3-fold increased risk for delivering a low birth weight baby.      Continue oral hematinic therapy. Recommend intermittent H/H throughout the pregnancy to assess response to supplementation and guide titration of dosing.   Reminded to do follow-up CBC and iron studies today .  Patient stated that she will go and do them today.      Follow up in about 1 week (around 2024) for M Followup, BPP.     Future Appointments   Date Time Provider Department Center   2024 10:45 AM ROOM 3, [facility]   2024 11:00 AM Miguel Cannon MD [facility]       Patient was evaluated and examined by Dr. Cannon. REGGIE Nelson, helped in pre charting of part of note.      This note was created with the assistance of [employer] voice recognition software. There may be transcription errors as a result of using this technology, however minimal. Effort has been made to assure accuracy of transcription, but any obvious errors or omissions should be clarified with the author of the document.

## 2024-08-01 ENCOUNTER — OFFICE VISIT (OUTPATIENT)
Dept: MATERNAL FETAL MEDICINE | Facility: CLINIC | Age: 24
End: 2024-08-01
Payer: MEDICAID

## 2024-08-01 ENCOUNTER — PROCEDURE VISIT (OUTPATIENT)
Dept: MATERNAL FETAL MEDICINE | Facility: CLINIC | Age: 24
End: 2024-08-01
Payer: MEDICAID

## 2024-08-01 VITALS
WEIGHT: 199 LBS | DIASTOLIC BLOOD PRESSURE: 69 MMHG | HEART RATE: 76 BPM | HEIGHT: 65 IN | SYSTOLIC BLOOD PRESSURE: 110 MMHG | BODY MASS INDEX: 33.15 KG/M2

## 2024-08-01 DIAGNOSIS — O99.013 ANEMIA AFFECTING PREGNANCY IN THIRD TRIMESTER: ICD-10-CM

## 2024-08-01 DIAGNOSIS — B00.9 HERPES SIMPLEX VIRUS TYPE 2 (HSV-2) INFECTION AFFECTING PREGNANCY IN THIRD TRIMESTER: ICD-10-CM

## 2024-08-01 DIAGNOSIS — F32.A DEPRESSION DURING PREGNANCY IN THIRD TRIMESTER: Primary | ICD-10-CM

## 2024-08-01 DIAGNOSIS — O09.293 PRIOR PREGNANCY WITH FETAL DEMISE AND CURRENT PREGNANCY IN THIRD TRIMESTER: ICD-10-CM

## 2024-08-01 DIAGNOSIS — O98.513 HERPES SIMPLEX VIRUS TYPE 2 (HSV-2) INFECTION AFFECTING PREGNANCY IN THIRD TRIMESTER: ICD-10-CM

## 2024-08-01 DIAGNOSIS — O99.343 DEPRESSION DURING PREGNANCY IN THIRD TRIMESTER: Primary | ICD-10-CM

## 2024-08-01 DIAGNOSIS — O09.299 HX OF INTRAUTERINE GROWTH RESTRICTION IN PRIOR PREGNANCY, CURRENTLY PREGNANT: ICD-10-CM

## 2024-08-01 DIAGNOSIS — O09.90 AT HIGH RISK FOR COMPLICATIONS OF INTRAUTERINE PREGNANCY (IUP): ICD-10-CM

## 2024-08-01 DIAGNOSIS — O99.213 OBESITY AFFECTING PREGNANCY IN THIRD TRIMESTER, UNSPECIFIED OBESITY TYPE: ICD-10-CM

## 2024-08-01 DIAGNOSIS — O99.343 DEPRESSION DURING PREGNANCY IN THIRD TRIMESTER: ICD-10-CM

## 2024-08-01 DIAGNOSIS — F32.A DEPRESSION DURING PREGNANCY IN THIRD TRIMESTER: ICD-10-CM

## 2024-08-01 PROCEDURE — 76819 FETAL BIOPHYS PROFIL W/O NST: CPT | Mod: S$GLB,,, | Performed by: OBSTETRICS & GYNECOLOGY

## 2024-08-01 PROCEDURE — 1159F MED LIST DOCD IN RCRD: CPT | Mod: CPTII,S$GLB,, | Performed by: OBSTETRICS & GYNECOLOGY

## 2024-08-01 PROCEDURE — 3008F BODY MASS INDEX DOCD: CPT | Mod: CPTII,S$GLB,, | Performed by: OBSTETRICS & GYNECOLOGY

## 2024-08-01 PROCEDURE — 3074F SYST BP LT 130 MM HG: CPT | Mod: CPTII,S$GLB,, | Performed by: OBSTETRICS & GYNECOLOGY

## 2024-08-01 PROCEDURE — 99213 OFFICE O/P EST LOW 20 MIN: CPT | Mod: TH,S$GLB,, | Performed by: OBSTETRICS & GYNECOLOGY

## 2024-08-01 PROCEDURE — 1160F RVW MEDS BY RX/DR IN RCRD: CPT | Mod: CPTII,S$GLB,, | Performed by: OBSTETRICS & GYNECOLOGY

## 2024-08-01 PROCEDURE — 3078F DIAST BP <80 MM HG: CPT | Mod: CPTII,S$GLB,, | Performed by: OBSTETRICS & GYNECOLOGY

## 2024-08-01 RX ORDER — VALACYCLOVIR HYDROCHLORIDE 500 MG/1
500 TABLET, FILM COATED ORAL 2 TIMES DAILY
Qty: 60 TABLET | Refills: 3 | Status: SHIPPED | OUTPATIENT
Start: 2024-08-01 | End: 2025-08-01

## 2024-08-02 DIAGNOSIS — O99.013 ANEMIA AFFECTING PREGNANCY IN THIRD TRIMESTER: Primary | ICD-10-CM

## 2024-08-02 DIAGNOSIS — O09.292 PRIOR PREGNANCY WITH FETAL DEMISE AND CURRENT PREGNANCY IN SECOND TRIMESTER: ICD-10-CM

## 2024-08-02 DIAGNOSIS — O99.213 OBESITY AFFECTING PREGNANCY IN THIRD TRIMESTER, UNSPECIFIED OBESITY TYPE: ICD-10-CM

## 2024-08-07 ENCOUNTER — OFFICE VISIT (OUTPATIENT)
Dept: MATERNAL FETAL MEDICINE | Facility: CLINIC | Age: 24
End: 2024-08-07
Payer: MEDICAID

## 2024-08-07 ENCOUNTER — PROCEDURE VISIT (OUTPATIENT)
Dept: MATERNAL FETAL MEDICINE | Facility: CLINIC | Age: 24
End: 2024-08-07
Payer: MEDICAID

## 2024-08-07 VITALS
BODY MASS INDEX: 33.15 KG/M2 | HEIGHT: 65 IN | HEART RATE: 83 BPM | DIASTOLIC BLOOD PRESSURE: 71 MMHG | SYSTOLIC BLOOD PRESSURE: 125 MMHG | WEIGHT: 199 LBS

## 2024-08-07 DIAGNOSIS — Z36.89 ENCOUNTER FOR FETAL ANATOMIC SURVEY: ICD-10-CM

## 2024-08-07 DIAGNOSIS — O99.213 OBESITY AFFECTING PREGNANCY IN THIRD TRIMESTER, UNSPECIFIED OBESITY TYPE: ICD-10-CM

## 2024-08-07 DIAGNOSIS — O99.343 DEPRESSION DURING PREGNANCY IN THIRD TRIMESTER: ICD-10-CM

## 2024-08-07 DIAGNOSIS — O99.013 ANEMIA AFFECTING PREGNANCY IN THIRD TRIMESTER: ICD-10-CM

## 2024-08-07 DIAGNOSIS — O09.299 HX OF INTRAUTERINE GROWTH RESTRICTION IN PRIOR PREGNANCY, CURRENTLY PREGNANT: ICD-10-CM

## 2024-08-07 DIAGNOSIS — O98.513 HERPES SIMPLEX VIRUS TYPE 2 (HSV-2) INFECTION AFFECTING PREGNANCY IN THIRD TRIMESTER: Primary | ICD-10-CM

## 2024-08-07 DIAGNOSIS — O09.293 PRIOR PREGNANCY WITH FETAL DEMISE AND CURRENT PREGNANCY IN THIRD TRIMESTER: ICD-10-CM

## 2024-08-07 DIAGNOSIS — O09.293 PRIOR PREGNANCY WITH FETAL DEMISE AND CURRENT PREGNANCY IN THIRD TRIMESTER: Primary | ICD-10-CM

## 2024-08-07 DIAGNOSIS — B00.9 HERPES SIMPLEX VIRUS TYPE 2 (HSV-2) INFECTION AFFECTING PREGNANCY IN THIRD TRIMESTER: Primary | ICD-10-CM

## 2024-08-07 DIAGNOSIS — O09.292 PRIOR PREGNANCY WITH FETAL DEMISE AND CURRENT PREGNANCY IN SECOND TRIMESTER: ICD-10-CM

## 2024-08-07 DIAGNOSIS — F32.A DEPRESSION DURING PREGNANCY IN THIRD TRIMESTER: ICD-10-CM

## 2024-08-07 PROCEDURE — 1159F MED LIST DOCD IN RCRD: CPT | Mod: CPTII,,, | Performed by: OBSTETRICS & GYNECOLOGY

## 2024-08-07 PROCEDURE — 99213 OFFICE O/P EST LOW 20 MIN: CPT | Mod: TH,,, | Performed by: OBSTETRICS & GYNECOLOGY

## 2024-08-07 PROCEDURE — 3074F SYST BP LT 130 MM HG: CPT | Mod: CPTII,,, | Performed by: OBSTETRICS & GYNECOLOGY

## 2024-08-07 PROCEDURE — 76819 FETAL BIOPHYS PROFIL W/O NST: CPT | Mod: ,,, | Performed by: OBSTETRICS & GYNECOLOGY

## 2024-08-07 PROCEDURE — 1160F RVW MEDS BY RX/DR IN RCRD: CPT | Mod: CPTII,,, | Performed by: OBSTETRICS & GYNECOLOGY

## 2024-08-07 PROCEDURE — 3008F BODY MASS INDEX DOCD: CPT | Mod: CPTII,,, | Performed by: OBSTETRICS & GYNECOLOGY

## 2024-08-07 PROCEDURE — 3078F DIAST BP <80 MM HG: CPT | Mod: CPTII,,, | Performed by: OBSTETRICS & GYNECOLOGY

## 2024-08-10 ENCOUNTER — ANESTHESIA EVENT (OUTPATIENT)
Dept: OBSTETRICS AND GYNECOLOGY | Facility: HOSPITAL | Age: 24
End: 2024-08-10
Payer: MEDICAID

## 2024-08-10 ENCOUNTER — ANESTHESIA (OUTPATIENT)
Dept: OBSTETRICS AND GYNECOLOGY | Facility: HOSPITAL | Age: 24
End: 2024-08-10
Payer: MEDICAID

## 2024-08-10 ENCOUNTER — HOSPITAL ENCOUNTER (INPATIENT)
Facility: HOSPITAL | Age: 24
LOS: 2 days | Discharge: HOME | End: 2024-08-12
Attending: OBSTETRICS & GYNECOLOGY | Admitting: OBSTETRICS & GYNECOLOGY
Payer: MEDICAID

## 2024-08-10 DIAGNOSIS — Z3A.36 36 WEEKS GESTATION OF PREGNANCY: Primary | ICD-10-CM

## 2024-08-10 DIAGNOSIS — O47.03 PRETERM UTERINE CONTRACTIONS, ANTEPARTUM, THIRD TRIMESTER: ICD-10-CM

## 2024-08-10 LAB
BASOPHILS # BLD AUTO: 0.03 X10(3)/MCL
BASOPHILS NFR BLD AUTO: 0.4 %
EOSINOPHIL # BLD AUTO: 0.03 X10(3)/MCL (ref 0–0.9)
EOSINOPHIL NFR BLD AUTO: 0.4 %
ERYTHROCYTE [DISTWIDTH] IN BLOOD BY AUTOMATED COUNT: 16 % (ref 11.5–17)
GROUP & RH: NORMAL
HBV SURFACE AG SERPL QL IA: NONREACTIVE
HCT VFR BLD AUTO: 35.4 % (ref 37–47)
HGB BLD-MCNC: 11.7 G/DL (ref 12–16)
HIV 1+2 AB+HIV1 P24 AG SERPL QL IA: NONREACTIVE
IMM GRANULOCYTES # BLD AUTO: 0.02 X10(3)/MCL (ref 0–0.04)
IMM GRANULOCYTES NFR BLD AUTO: 0.3 %
INDIRECT COOMBS: NORMAL
LYMPHOCYTES # BLD AUTO: 1.91 X10(3)/MCL (ref 0.6–4.6)
LYMPHOCYTES NFR BLD AUTO: 28.1 %
MCH RBC QN AUTO: 26 PG (ref 27–31)
MCHC RBC AUTO-ENTMCNC: 33.1 G/DL (ref 33–36)
MCV RBC AUTO: 78.7 FL (ref 80–94)
MONOCYTES # BLD AUTO: 0.45 X10(3)/MCL (ref 0.1–1.3)
MONOCYTES NFR BLD AUTO: 6.6 %
NEUTROPHILS # BLD AUTO: 4.35 X10(3)/MCL (ref 2.1–9.2)
NEUTROPHILS NFR BLD AUTO: 64.2 %
NRBC BLD AUTO-RTO: 0 %
PLATELET # BLD AUTO: 351 X10(3)/MCL (ref 130–400)
PLATELETS.RETICULATED NFR BLD AUTO: 2.4 % (ref 0.9–11.2)
PMV BLD AUTO: 9.5 FL (ref 7.4–10.4)
RBC # BLD AUTO: 4.5 X10(6)/MCL (ref 4.2–5.4)
SPECIMEN OUTDATE: NORMAL
T PALLIDUM AB SER QL: NONREACTIVE
WBC # BLD AUTO: 6.79 X10(3)/MCL (ref 4.5–11.5)

## 2024-08-10 PROCEDURE — 63600175 PHARM REV CODE 636 W HCPCS: Mod: JZ,JG | Performed by: NURSE ANESTHETIST, CERTIFIED REGISTERED

## 2024-08-10 PROCEDURE — 85025 COMPLETE CBC W/AUTO DIFF WBC: CPT | Performed by: STUDENT IN AN ORGANIZED HEALTH CARE EDUCATION/TRAINING PROGRAM

## 2024-08-10 PROCEDURE — 11000001 HC ACUTE MED/SURG PRIVATE ROOM

## 2024-08-10 PROCEDURE — 86762 RUBELLA ANTIBODY: CPT | Performed by: STUDENT IN AN ORGANIZED HEALTH CARE EDUCATION/TRAINING PROGRAM

## 2024-08-10 PROCEDURE — 25000003 PHARM REV CODE 250: Performed by: NURSE ANESTHETIST, CERTIFIED REGISTERED

## 2024-08-10 PROCEDURE — 87653 STREP B DNA AMP PROBE: CPT | Performed by: STUDENT IN AN ORGANIZED HEALTH CARE EDUCATION/TRAINING PROGRAM

## 2024-08-10 PROCEDURE — 87389 HIV-1 AG W/HIV-1&-2 AB AG IA: CPT | Performed by: STUDENT IN AN ORGANIZED HEALTH CARE EDUCATION/TRAINING PROGRAM

## 2024-08-10 PROCEDURE — 99285 EMERGENCY DEPT VISIT HI MDM: CPT | Mod: 25

## 2024-08-10 PROCEDURE — 86850 RBC ANTIBODY SCREEN: CPT | Performed by: STUDENT IN AN ORGANIZED HEALTH CARE EDUCATION/TRAINING PROGRAM

## 2024-08-10 PROCEDURE — 51702 INSERT TEMP BLADDER CATH: CPT

## 2024-08-10 PROCEDURE — 62326 NJX INTERLAMINAR LMBR/SAC: CPT | Performed by: NURSE ANESTHETIST, CERTIFIED REGISTERED

## 2024-08-10 PROCEDURE — 37000009 HC ANESTHESIA EA ADD 15 MINS: Performed by: ANESTHESIOLOGY

## 2024-08-10 PROCEDURE — 59409 OBSTETRICAL CARE: CPT | Mod: QZ,,, | Performed by: NURSE ANESTHETIST, CERTIFIED REGISTERED

## 2024-08-10 PROCEDURE — 86780 TREPONEMA PALLIDUM: CPT | Performed by: STUDENT IN AN ORGANIZED HEALTH CARE EDUCATION/TRAINING PROGRAM

## 2024-08-10 PROCEDURE — 63600175 PHARM REV CODE 636 W HCPCS: Performed by: STUDENT IN AN ORGANIZED HEALTH CARE EDUCATION/TRAINING PROGRAM

## 2024-08-10 PROCEDURE — 87340 HEPATITIS B SURFACE AG IA: CPT | Performed by: STUDENT IN AN ORGANIZED HEALTH CARE EDUCATION/TRAINING PROGRAM

## 2024-08-10 PROCEDURE — 86901 BLOOD TYPING SEROLOGIC RH(D): CPT | Performed by: STUDENT IN AN ORGANIZED HEALTH CARE EDUCATION/TRAINING PROGRAM

## 2024-08-10 PROCEDURE — 86900 BLOOD TYPING SEROLOGIC ABO: CPT | Performed by: STUDENT IN AN ORGANIZED HEALTH CARE EDUCATION/TRAINING PROGRAM

## 2024-08-10 PROCEDURE — 37000008 HC ANESTHESIA 1ST 15 MINUTES: Performed by: ANESTHESIOLOGY

## 2024-08-10 RX ORDER — FENTANYL/BUPIVACAINE/NS/PF 2-1250MCG
PLASTIC BAG, INJECTION (ML) INJECTION CONTINUOUS PRN
Status: DISCONTINUED | OUTPATIENT
Start: 2024-08-10 | End: 2024-08-11

## 2024-08-10 RX ORDER — PENICILLIN G 3000000 [IU]/50ML
3 INJECTION, SOLUTION INTRAVENOUS
Status: DISCONTINUED | OUTPATIENT
Start: 2024-08-10 | End: 2024-08-10

## 2024-08-10 RX ORDER — OXYTOCIN-SODIUM CHLORIDE 0.9% IV SOLN 30 UNIT/500ML 30-0.9/5 UT/ML-%
95 SOLUTION INTRAVENOUS ONCE
Status: COMPLETED | OUTPATIENT
Start: 2024-08-10 | End: 2024-08-11

## 2024-08-10 RX ORDER — LIDOCAINE HYDROCHLORIDE 10 MG/ML
10 INJECTION, SOLUTION INFILTRATION; PERINEURAL ONCE AS NEEDED
Status: DISCONTINUED | OUTPATIENT
Start: 2024-08-10 | End: 2024-08-12 | Stop reason: HOSPADM

## 2024-08-10 RX ORDER — FENTANYL/BUPIVACAINE/NS/PF 2-1250MCG
PLASTIC BAG, INJECTION (ML) INJECTION CONTINUOUS
Status: DISCONTINUED | OUTPATIENT
Start: 2024-08-10 | End: 2024-08-12 | Stop reason: HOSPADM

## 2024-08-10 RX ORDER — OXYTOCIN-SODIUM CHLORIDE 0.9% IV SOLN 30 UNIT/500ML 30-0.9/5 UT/ML-%
10 SOLUTION INTRAVENOUS ONCE
Status: DISCONTINUED | OUTPATIENT
Start: 2024-08-10 | End: 2024-08-12 | Stop reason: HOSPADM

## 2024-08-10 RX ORDER — SODIUM CHLORIDE, SODIUM LACTATE, POTASSIUM CHLORIDE, CALCIUM CHLORIDE 600; 310; 30; 20 MG/100ML; MG/100ML; MG/100ML; MG/100ML
INJECTION, SOLUTION INTRAVENOUS CONTINUOUS
Status: DISCONTINUED | OUTPATIENT
Start: 2024-08-10 | End: 2024-08-12 | Stop reason: HOSPADM

## 2024-08-10 RX ORDER — MUPIROCIN 20 MG/G
OINTMENT TOPICAL
Status: CANCELLED | OUTPATIENT
Start: 2024-08-10

## 2024-08-10 RX ORDER — MISOPROSTOL 100 UG/1
800 TABLET ORAL ONCE AS NEEDED
Status: DISCONTINUED | OUTPATIENT
Start: 2024-08-10 | End: 2024-08-11

## 2024-08-10 RX ORDER — CALCIUM CARBONATE 200(500)MG
500 TABLET,CHEWABLE ORAL 3 TIMES DAILY PRN
Status: DISCONTINUED | OUTPATIENT
Start: 2024-08-10 | End: 2024-08-12 | Stop reason: HOSPADM

## 2024-08-10 RX ORDER — ONDANSETRON 4 MG/1
8 TABLET, ORALLY DISINTEGRATING ORAL EVERY 8 HOURS PRN
Status: DISCONTINUED | OUTPATIENT
Start: 2024-08-10 | End: 2024-08-11

## 2024-08-10 RX ORDER — CARBOPROST TROMETHAMINE 250 UG/ML
250 INJECTION, SOLUTION INTRAMUSCULAR
Status: DISCONTINUED | OUTPATIENT
Start: 2024-08-10 | End: 2024-08-11

## 2024-08-10 RX ORDER — OXYTOCIN-SODIUM CHLORIDE 0.9% IV SOLN 30 UNIT/500ML 30-0.9/5 UT/ML-%
95 SOLUTION INTRAVENOUS ONCE AS NEEDED
Status: DISCONTINUED | OUTPATIENT
Start: 2024-08-10 | End: 2024-08-11

## 2024-08-10 RX ORDER — SIMETHICONE 80 MG
1 TABLET,CHEWABLE ORAL 4 TIMES DAILY PRN
Status: DISCONTINUED | OUTPATIENT
Start: 2024-08-10 | End: 2024-08-11

## 2024-08-10 RX ORDER — OXYTOCIN-SODIUM CHLORIDE 0.9% IV SOLN 30 UNIT/500ML 30-0.9/5 UT/ML-%
0-32 SOLUTION INTRAVENOUS CONTINUOUS
Status: DISCONTINUED | OUTPATIENT
Start: 2024-08-10 | End: 2024-08-12 | Stop reason: HOSPADM

## 2024-08-10 RX ORDER — METHYLERGONOVINE MALEATE 0.2 MG/ML
200 INJECTION INTRAVENOUS ONCE AS NEEDED
Status: DISCONTINUED | OUTPATIENT
Start: 2024-08-10 | End: 2024-08-11

## 2024-08-10 RX ORDER — OXYTOCIN 10 [USP'U]/ML
10 INJECTION, SOLUTION INTRAMUSCULAR; INTRAVENOUS ONCE AS NEEDED
Status: DISCONTINUED | OUTPATIENT
Start: 2024-08-10 | End: 2024-08-11

## 2024-08-10 RX ORDER — SODIUM CHLORIDE 9 MG/ML
INJECTION, SOLUTION INTRAVENOUS
Status: DISCONTINUED | OUTPATIENT
Start: 2024-08-10 | End: 2024-08-12 | Stop reason: HOSPADM

## 2024-08-10 RX ORDER — CEFAZOLIN SODIUM 2 G/50ML
2 SOLUTION INTRAVENOUS ONCE
Status: COMPLETED | OUTPATIENT
Start: 2024-08-10 | End: 2024-08-10

## 2024-08-10 RX ORDER — OXYTOCIN-SODIUM CHLORIDE 0.9% IV SOLN 30 UNIT/500ML 30-0.9/5 UT/ML-%
10 SOLUTION INTRAVENOUS ONCE AS NEEDED
Status: DISCONTINUED | OUTPATIENT
Start: 2024-08-10 | End: 2024-08-11

## 2024-08-10 RX ORDER — BUPIVACAINE HYDROCHLORIDE 2.5 MG/ML
INJECTION, SOLUTION EPIDURAL; INFILTRATION; INTRACAUDAL
Status: DISCONTINUED | OUTPATIENT
Start: 2024-08-10 | End: 2024-08-11

## 2024-08-10 RX ORDER — DIPHENOXYLATE HYDROCHLORIDE AND ATROPINE SULFATE 2.5; .025 MG/1; MG/1
2 TABLET ORAL EVERY 6 HOURS PRN
Status: DISCONTINUED | OUTPATIENT
Start: 2024-08-10 | End: 2024-08-11

## 2024-08-10 RX ADMIN — CEFAZOLIN SODIUM 2 G: 2 SOLUTION INTRAVENOUS at 07:08

## 2024-08-10 RX ADMIN — Medication 12 ML/HR: at 10:08

## 2024-08-10 RX ADMIN — BUPIVACAINE HYDROCHLORIDE 8 ML: 2.5 INJECTION, SOLUTION EPIDURAL; INFILTRATION; INTRACAUDAL; PERINEURAL at 10:08

## 2024-08-10 RX ADMIN — SODIUM CHLORIDE, POTASSIUM CHLORIDE, SODIUM LACTATE AND CALCIUM CHLORIDE: 600; 310; 30; 20 INJECTION, SOLUTION INTRAVENOUS at 04:08

## 2024-08-10 RX ADMIN — SODIUM CHLORIDE, POTASSIUM CHLORIDE, SODIUM LACTATE AND CALCIUM CHLORIDE: 600; 310; 30; 20 INJECTION, SOLUTION INTRAVENOUS at 10:08

## 2024-08-10 RX ADMIN — DEXTROSE MONOHYDRATE 1 G: 5 INJECTION INTRAVENOUS at 11:08

## 2024-08-10 RX ADMIN — SODIUM CHLORIDE, POTASSIUM CHLORIDE, SODIUM LACTATE AND CALCIUM CHLORIDE: 600; 310; 30; 20 INJECTION, SOLUTION INTRAVENOUS at 09:08

## 2024-08-10 NOTE — HPI
23 yr  @ 36 wk 6 d presents to [facility] with ctx's.  Pt states no complications with this pregnancy.  Pt has hx of HSV-2 but no recent outbreaks and pt currently taking Valtrex 500 mg bid.  Pt has hx of 24 wk fetal demise.

## 2024-08-10 NOTE — ASSESSMENT & PLAN NOTE
Maternal and fetal status reassuring, cervix changed from 2 to 4 cm during course of observation and pt admitted in labor.  Care to be assumed by primary OB.

## 2024-08-10 NOTE — H&P
[facility] - Emergency Dept (OB)  Obstetrics  [facility] History & Physical    Patient Name: Demond Phipps  MRN: [id number]  Admission Date: 8/10/2024  Primary Care Provider: Annita Mejia FNP    Subjective:     Principal Problem: uterine contractions, antepartum, third trimester    History of Present Illness:  23 yr  @ 36 wk 6 d presents to [facility] with ctx's.  Pt states no complications with this pregnancy.  Pt has hx of HSV-2 but no recent outbreaks and pt currently taking Valtrex 500 mg bid.  Pt has hx of 24 wk fetal demise.    Obstetric HPI:  Patient reports Intensity: mild contractions, active fetal movement, No vaginal bleeding , No loss of fluid     This pregnancy has been complicated by hx of fetal demise in prior pregnancy and hx of HSV-2, anxiety and anemia    OB History    Para Term  AB Living   5 4 3 1 0 3   SAB IAB Ectopic Multiple Live Births   0 0 0 0 3      # Outcome Date GA Lbr Lebron/2nd Weight Sex Type Anes PTL Lv   5 Current            4 Term 21 37w0d  2.325 kg (5 lb 2 oz) M Vag-Spont EPI N MIYA   3 Term 20 37w0d  2.722 kg (6 lb) F Vag-Spont EPI N MIYA   2 Term 19 37w0d  3.09 kg (6 lb 13 oz) M Vag-Spont EPI N MIYA   1  2018 24w0d   F   Y FD      Birth Comments: [facility]     Past Medical History:   Diagnosis Date    Anemia 2019    for this pregnancy    Anxiety disorder, unspecified 2018    current sometimes    Asthma 1998    not current    Herpes simplex virus (HSV) infection     Panic attacks     not currently    Postpartum depression     not current     History reviewed. No pertinent surgical history.    (Not in a hospital admission)      Review of patient's allergies indicates:   Allergen Reactions    Amoxicillin Hives        Family History       Problem Relation (Age of Onset)    Hypertension Mother    Miscarriages / Stillbirths Paternal Grandmother, Maternal Grandmother    Seizures Maternal Grandmother           Tobacco Use    Smoking status: Never     Passive exposure: Never    Smokeless tobacco: Never   Substance and Sexual Activity    Alcohol use: Not Currently     Comment: twice a month/socially    Drug use: Not Currently    Sexual activity: Yes     Partners: Male     Review of Systems   Constitutional: Negative.    Respiratory: Negative.     Cardiovascular: Negative.    Gastrointestinal: Negative.    Endocrine: Negative.    Genitourinary: Negative.    Musculoskeletal: Negative.    Neurological: Negative.    Psychiatric/Behavioral: Negative.     Breast: negative.       Objective:     Vital Signs (Most Recent):  Temp: 98.4 °F (36.9 °C) (08/10/24 1329)  Pulse: 93 (08/10/24 1329)  Resp: 18 (08/10/24 1329)  BP: 119/79 (08/10/24 1329)  SpO2: 98 % (08/10/24 1329) Vital Signs (24h Range):  Temp:  [98.4 °F (36.9 °C)] 98.4 °F (36.9 °C)  Pulse:  [93] 93  Resp:  [18] 18  SpO2:  [98 %] 98 %  BP: (119)/(79) 119/79        There is no height or weight on file to calculate BMI.    FHT: 130, reactive Cat 1 (reassuring)  TOCO:  irreg     Physical Exam:   Constitutional: She is oriented to person, place, and time. She appears well-developed and well-nourished. No distress.    HENT:   Head: Normocephalic and atraumatic.       Pulmonary/Chest: Effort normal. No respiratory distress.        Abdominal: Soft. She exhibits no distension and no mass. There is no abdominal tenderness. There is no rebound and no guarding.             Musculoskeletal: Normal range of motion and moves all extremeties. No tenderness.       Neurological: She is alert and oriented to person, place, and time. No cranial nerve deficit. Coordination normal.    Skin: Skin is warm. She is not diaphoretic. No erythema.    Psychiatric: She has a normal mood and affect. Her behavior is normal. Judgment and thought content normal.        Cervix:  Dilation:  2  Effacement:  50%  Station: -2  Presentation: Vertex     Significant Labs:  Lab Results   Component Value Date     GROUPTRH A POS 2021    HEPBSAG Negative 2018       I have personallly reviewed all pertinent lab results from the last 24 hours.  Assessment/Plan:     23 y.o. female  at 36w6d for:    *  uterine contractions, antepartum, third trimester  Maternal and fetal status reassuring, cervix changed from 2 to 4 cm during course of observation and pt admitted in labor.  Care to be assumed by primary OB.    36 weeks gestation of pregnancy  Maternal and fetal status reassuring, cervix changed from 2 to 4 cm during course of observation and pt admitted in labor.  Care to be assumed by primary OB.    Depression during pregnancy in third trimester  Continue Zoloft as prescribed    Herpes simplex virus type 2 (HSV-2) infection affecting pregnancy in third trimester  No evidence of HSV lesions at this time.  Anticipate     Hx of intrauterine growth restriction in prior pregnancy, currently pregnant  Maternal and fetal status reassuring, cervix changed from 2 to 4 cm during course of observation and pt admitted in labor.  Care to be assumed by primary OB.        Volodymyr Whaley MD  Obstetrics  [facility] - Emergency Dept (OB)

## 2024-08-10 NOTE — PLAN OF CARE
Problem: Infection  Goal: Absence of Infection Signs and Symptoms  8/10/2024 1745 by Jennifer Noel RN  Outcome: Progressing  8/10/2024 1744 by Jennifer Noel RN  Outcome: Progressing     Problem: Adult Inpatient Plan of Care  Goal: Plan of Care Review  8/10/2024 1745 by Jennifer Noel RN  Outcome: Progressing  8/10/2024 1744 by Jennifer Noel RN  Outcome: Progressing  Goal: Patient-Specific Goal (Individualized)  8/10/2024 1745 by Jennifer Noel RN  Outcome: Progressing  8/10/2024 1744 by Jennifer Noel RN  Outcome: Progressing  Goal: Absence of Hospital-Acquired Illness or Injury  8/10/2024 1745 by Jennifer Noel RN  Outcome: Progressing  8/10/2024 1744 by Jennifer Noel RN  Outcome: Progressing  Goal: Optimal Comfort and Wellbeing  8/10/2024 1745 by Jennifer Noel RN  Outcome: Progressing  8/10/2024 1744 by Jennifer Noel RN  Outcome: Progressing  Goal: Readiness for Transition of Care  8/10/2024 1745 by Jennifer Noel RN  Outcome: Progressing  8/10/2024 1744 by Jennifer Noel RN  Outcome: Progressing

## 2024-08-10 NOTE — HOSPITAL COURSE
Pt noted upon initial exam to be dilated 2/50/-2 with irreg ctx's.  Pt denies any ROM or bleeding.  During course of observation, maternal and fetal status was reassuring.   V-Y Plasty Text: The defect edges were debeveled with a #15 scalpel blade.  Given the location of the defect, shape of the defect and the proximity to free margins an V-Y advancement flap was deemed most appropriate.  Using a sterile surgical marker, an appropriate advancement flap was drawn incorporating the defect and placing the expected incisions within the relaxed skin tension lines where possible.    The area thus outlined was incised deep to adipose tissue with a #15 scalpel blade.  The skin margins were undermined to an appropriate distance in all directions utilizing iris scissors.

## 2024-08-10 NOTE — H&P
L&D - History & Physical    Chief Complaint: labor     HPI:      Demond Phipps is a 23 y.o.  at 36w6d who presents today for evaluation of above chief complaint.      Abdominal Cramping (IUP  c/o abdominal cramping q10 min, pt states hx of  delivery in past)    Made change from 2cm to 4cm in triage     No LMP recorded (lmp unknown). Patient is pregnant.     OB History    Para Term  AB Living   5 4 3 1   3   SAB IAB Ectopic Multiple Live Births           3      # Outcome Date GA Lbr Lebron/2nd Weight Sex Type Anes PTL Lv   5 Current            4 Term 21 37w0d  2.325 kg (5 lb 2 oz) M Vag-Spont EPI N MIYA   3 Term 20 37w0d  2.722 kg (6 lb) F Vag-Spont EPI N MIYA   2 Term 19 37w0d  3.09 kg (6 lb 13 oz) M Vag-Spont EPI N MIYA   1  2018 24w0d   F   Y FD      Birth Comments: [facility]       Past Medical History:   Diagnosis Date    Anemia 2019    for this pregnancy    Anxiety disorder, unspecified 2018    current sometimes    Asthma 1998    not current    Herpes simplex virus (HSV) infection 2018    Panic attacks     not currently    Postpartum depression     not current       History reviewed. No pertinent surgical history.    Family History   Problem Relation Name Age of Onset    Miscarriages / Stillbirths Paternal Grandmother      Miscarriages / Stillbirths Maternal Grandmother      Seizures Maternal Grandmother      Hypertension Mother         Social History     Socioeconomic History    Marital status: Single   Tobacco Use    Smoking status: Never     Passive exposure: Never    Smokeless tobacco: Never   Substance and Sexual Activity    Alcohol use: Not Currently     Comment: twice a month/socially    Drug use: Not Currently    Sexual activity: Yes     Partners: Male     Social Determinants of Health     Financial Resource Strain: Medium Risk (2020)    Received from [facility] and  Affiliates, [facility]    Overall Financial Resource Strain (CARDIA)     Difficulty of Paying Living Expenses: Somewhat hard   Food Insecurity: No Food Insecurity (5/18/2020)    [facility]    Hunger Vital Sign     Worried About Running Out of Food in the Last Year: Never true     Ran Out of Food in the Last Year: Never true   Transportation Needs: No Transportation Needs (5/18/2020)    [facility]    PRAPARE - Transportation     Lack of Transportation (Medical): No     Lack of Transportation (Non-Medical): No   Physical Activity: Inactive (5/18/2020)    [facility], [facility]    Exercise Vital Sign     Days of Exercise per Week: 0 days     Minutes of Exercise per Session: 0 min   Stress: Stress Concern Present (5/18/2020)    [facility]    [employer] - Occupational Stress Questionnaire     Feeling of Stress : To some extent       Current Facility-Administered Medications   Medication Dose Route Frequency Provider Last Rate Last Admin    0.9%  NaCl infusion   Intra-Catheter PRN Tate Stern MD        calcium carbonate 200 mg calcium (500 mg) chewable tablet 500 mg  500 mg Oral TID PRN Tate Stern MD        carboprost injection 250  mcg  250 mcg Intramuscular Q15 Min PRN Tate Stern MD        diphenoxylate-atropine 2.5-0.025 mg per tablet 2 tablet  2 tablet Oral Q6H PRN Tate Stern MD        lactated ringers bolus 1,000 mL  1,000 mL Intravenous PRN Tate Stern MD        lactated ringers bolus 500 mL  500 mL Intravenous PRN Tate Stern MD        lactated ringers infusion   Intravenous Continuous Tate Stern MD        LIDOcaine HCL 10 mg/ml (1%) injection 10 mL  10 mL Intradermal Once PRN Tate Stern MD        methylergonovine injection 200 mcg  200 mcg Intramuscular Once PRN Tate Stern MD        miSOPROStoL tablet 800 mcg  800 mcg Rectal Once PRN Tate Stern MD        miSOPROStoL tablet 800 mcg  800 mcg Oral Once PRN Tate Stern MD        ondansetron disintegrating tablet 8 mg  8 mg Oral Q8H PRN Tate Stern MD        oxytocin 30 units in 500 mL normal saline infusion (loading dose)  10.0002 Units Intravenous Once Tate Stern MD        oxytocin 30 units in 500 mL normal saline infusion (loading dose)  10.0002 Units Intravenous Once PRTate Gutierrez MD        oxytocin 30 units/500 mL (60 milliunits/mL) in 0.9% NaCl (TITRATING)  95 adriane-units/min Intravenous Once Tate Stern MD        oxytocin 30 units/500 mL (60 milliunits/mL) in 0.9% NaCl (TITRATING)  95 adriane-units/min Intravenous Once Tate Murrell MD        oxytocin injection 10 Units  10 Units Intramuscular Once PRN Tate Stern MD        penicillin G potassium 3 million unit/50 mL IVPB 3 Million Units  3 Million Units Intravenous Q4H Tate Stern MD        penicillin G potassium 5 Million Units in D5W 100 mL IVPB (MB+)  5 Million Units Intravenous Once Tate Stern MD        simethicone chewable tablet 80 mg  1 tablet Oral QID PRN Tate Stern MD        tranexamic acid (CYKLOKAPRON) 1,000 mg in 0.9% NaCl 100 mL IVPB (MB+)  1,000 mg Intravenous Q30 Min PRN Tate Stern MD         Current Outpatient Medications    Medication Sig Dispense Refill    aspirin (ECOTRIN) 81 MG EC tablet Take 81 mg by mouth once daily.      ferrous sulfate (FEOSOL) 325 mg (65 mg iron) Tab tablet Take 1 tablet (325 mg total) by mouth every other day. 20 tablet 3    prenatal vit/iron fum/folic ac (PRENATAL 1+1 ORAL) Take by mouth.      sertraline (ZOLOFT) 25 MG tablet Take 25 mg by mouth once daily.      valACYclovir (VALTREX) 500 MG tablet Take 1 tablet (500 mg total) by mouth 2 (two) times daily. 60 tablet 3    ascorbic acid, vitamin C, (VITAMIN C) 250 MG tablet Take 1 tablet (250 mg total) by mouth every other day. 20 tablet 3    folic acid (FOLVITE) 1 MG tablet Take 1 tablet (1 mg total) by mouth once daily. 30 tablet 3       Review of patient's allergies indicates:   Allergen Reactions    Amoxicillin Hives         Physical Exam:      /69   Pulse 98   Temp 98.4 °F (36.9 °C) (Oral)   Resp 18   LMP  (LMP Unknown)   SpO2 99%   There is no height or weight on file to calculate BMI.     APPEARANCE: Well nourished, well developed, in no acute distress.  PSYCH: Appropriate mood and affect.  CHEST: Normal respiratory effort,  CV: Normal capillary refill.  ABDOMEN: Soft.  No tenderness or masses.    PELVIC:  4/70/-2  EXTREMITIES: No edema       Results:     Results for orders placed or performed during the hospital encounter of 07/04/24   Urinalysis, Reflex to Urine Culture    Specimen: Urine   Result Value Ref Range    Color, UA Yellow Yellow, Light-Yellow, Colorless, Straw, Dark-Yellow    Appearance, UA Clear Clear    Specific Gravity, UA 1.019 1.005 - 1.030    pH, UA 6.5 5.0 - 8.5    Protein, UA Trace (A) Negative    Glucose, UA Normal Negative, Normal    Ketones, UA 3+ (A) Negative    Blood, UA Negative Negative    Bilirubin, UA Negative Negative    Urobilinogen, UA Normal 0.2, 1.0, Normal    Nitrites, UA Negative Negative    Leukocyte Esterase,  (A) Negative    RBC, UA 0-5 None Seen, 0-2, 3-5, 0-5 /HPF    WBC, UA 6-10 (A) None Seen,  0-2, 3-5, 0-5 /HPF    Bacteria, UA Trace None Seen, Trace /HPF    Squamous Epithelial Cells, UA Trace None Seen /HPF    Mucous, UA Many (A) None Seen /LPF    Hyaline Casts, UA 3-5 (A) None Seen /lpf         Assessment/Plan:     Active Problem List with Overview Notes    Diagnosis Date Noted     uterine contractions, antepartum, third trimester 08/10/2024    36 weeks gestation of pregnancy 08/10/2024    Anemia affecting pregnancy in third trimester 2024    Depression during pregnancy in third trimester 04/10/2024    Anxiety 04/10/2024    Prior pregnancy with fetal demise and current pregnancy in third trimester 2024    Hx of intrauterine growth restriction in prior pregnancy, currently pregnant 2024    Herpes simplex virus type 2 (HSV-2) infection affecting pregnancy in third trimester 2024    At high risk for complications of intrauterine pregnancy (IUP) 2024    Increased BMI affecting pregnancy in third trimester 2024      PCN allgery, GBS unk, will obtain rapid GBS and treat with vanc if (+)    Admit to L&D for expectant management    Tate Stern MD  08/10/2024 4:29 PM

## 2024-08-10 NOTE — SUBJECTIVE & OBJECTIVE
Obstetric HPI:  Patient reports Intensity: mild contractions, active fetal movement, No vaginal bleeding , No loss of fluid     This pregnancy has been complicated by hx of fetal demise in prior pregnancy and hx of HSV-2, anxiety and anemia    OB History    Para Term  AB Living   5 4 3 1 0 3   SAB IAB Ectopic Multiple Live Births   0 0 0 0 3      # Outcome Date GA Lbr Lebron/2nd Weight Sex Type Anes PTL Lv   5 Current            4 Term 21 37w0d  2.325 kg (5 lb 2 oz) M Vag-Spont EPI N MIYA   3 Term 20 37w0d  2.722 kg (6 lb) F Vag-Spont EPI N MIYA   2 Term 19 37w0d  3.09 kg (6 lb 13 oz) M Vag-Spont EPI N MIYA   1  2018 24w0d   F   Y FD      Birth Comments: [facility]     Past Medical History:   Diagnosis Date    Anemia     for this pregnancy    Anxiety disorder, unspecified     current sometimes    Asthma     not current    Herpes simplex virus (HSV) infection     Panic attacks     not currently    Postpartum depression     not current     History reviewed. No pertinent surgical history.    (Not in a hospital admission)      Review of patient's allergies indicates:   Allergen Reactions    Amoxicillin Hives        Family History       Problem Relation (Age of Onset)    Hypertension Mother    Miscarriages / Stillbirths Paternal Grandmother, Maternal Grandmother    Seizures Maternal Grandmother          Tobacco Use    Smoking status: Never     Passive exposure: Never    Smokeless tobacco: Never   Substance and Sexual Activity    Alcohol use: Not Currently     Comment: twice a month/socially    Drug use: Not Currently    Sexual activity: Yes     Partners: Male     Review of Systems   Constitutional: Negative.    Respiratory: Negative.     Cardiovascular: Negative.    Gastrointestinal: Negative.    Endocrine: Negative.    Genitourinary: Negative.    Musculoskeletal: Negative.    Neurological: Negative.    Psychiatric/Behavioral: Negative.     Breast: negative.        Objective:     Vital Signs (Most Recent):  Temp: 98.4 °F (36.9 °C) (08/10/24 1329)  Pulse: 93 (08/10/24 1329)  Resp: 18 (08/10/24 1329)  BP: 119/79 (08/10/24 1329)  SpO2: 98 % (08/10/24 1329) Vital Signs (24h Range):  Temp:  [98.4 °F (36.9 °C)] 98.4 °F (36.9 °C)  Pulse:  [93] 93  Resp:  [18] 18  SpO2:  [98 %] 98 %  BP: (119)/(79) 119/79        There is no height or weight on file to calculate BMI.    FHT: 130, reactive Cat 1 (reassuring)  TOCO:  irreg     Physical Exam:   Constitutional: She is oriented to person, place, and time. She appears well-developed and well-nourished. No distress.    HENT:   Head: Normocephalic and atraumatic.       Pulmonary/Chest: Effort normal. No respiratory distress.        Abdominal: Soft. She exhibits no distension and no mass. There is no abdominal tenderness. There is no rebound and no guarding.             Musculoskeletal: Normal range of motion and moves all extremeties. No tenderness.       Neurological: She is alert and oriented to person, place, and time. No cranial nerve deficit. Coordination normal.    Skin: Skin is warm. She is not diaphoretic. No erythema.    Psychiatric: She has a normal mood and affect. Her behavior is normal. Judgment and thought content normal.        Cervix:  Dilation:  2  Effacement:  50%  Station: -2  Presentation: Vertex     Significant Labs:  Lab Results   Component Value Date    GROUPTRH A POS 09/09/2021    HEPBSAG Negative 08/25/2018       I have personallly reviewed all pertinent lab results from the last 24 hours.

## 2024-08-11 PROCEDURE — 11000001 HC ACUTE MED/SURG PRIVATE ROOM

## 2024-08-11 PROCEDURE — 72100002 HC LABOR CARE, 1ST 8 HOURS

## 2024-08-11 PROCEDURE — 72100003 HC LABOR CARE, EA. ADDL. 8 HRS

## 2024-08-11 PROCEDURE — 10907ZC DRAINAGE OF AMNIOTIC FLUID, THERAPEUTIC FROM PRODUCTS OF CONCEPTION, VIA NATURAL OR ARTIFICIAL OPENING: ICD-10-PCS | Performed by: STUDENT IN AN ORGANIZED HEALTH CARE EDUCATION/TRAINING PROGRAM

## 2024-08-11 PROCEDURE — 25000003 PHARM REV CODE 250: Performed by: STUDENT IN AN ORGANIZED HEALTH CARE EDUCATION/TRAINING PROGRAM

## 2024-08-11 PROCEDURE — 63600175 PHARM REV CODE 636 W HCPCS: Performed by: STUDENT IN AN ORGANIZED HEALTH CARE EDUCATION/TRAINING PROGRAM

## 2024-08-11 PROCEDURE — 72200005 HC VAGINAL DELIVERY LEVEL II

## 2024-08-11 RX ORDER — EPHEDRINE SULFATE 50 MG/ML
10 INJECTION, SOLUTION INTRAVENOUS ONCE AS NEEDED
Status: DISCONTINUED | OUTPATIENT
Start: 2024-08-11 | End: 2024-08-12 | Stop reason: HOSPADM

## 2024-08-11 RX ORDER — ACETAMINOPHEN 500 MG
1000 TABLET ORAL EVERY 6 HOURS
Status: DISCONTINUED | OUTPATIENT
Start: 2024-08-11 | End: 2024-08-12 | Stop reason: HOSPADM

## 2024-08-11 RX ORDER — OXYTOCIN 10 [USP'U]/ML
10 INJECTION, SOLUTION INTRAMUSCULAR; INTRAVENOUS ONCE AS NEEDED
Status: DISCONTINUED | OUTPATIENT
Start: 2024-08-11 | End: 2024-08-12 | Stop reason: HOSPADM

## 2024-08-11 RX ORDER — METHYLERGONOVINE MALEATE 0.2 MG/ML
200 INJECTION INTRAVENOUS ONCE AS NEEDED
Status: DISCONTINUED | OUTPATIENT
Start: 2024-08-11 | End: 2024-08-12 | Stop reason: HOSPADM

## 2024-08-11 RX ORDER — IBUPROFEN 600 MG/1
600 TABLET ORAL EVERY 6 HOURS
Status: DISCONTINUED | OUTPATIENT
Start: 2024-08-11 | End: 2024-08-12 | Stop reason: HOSPADM

## 2024-08-11 RX ORDER — FAMOTIDINE 10 MG/ML
20 INJECTION INTRAVENOUS ONCE
Status: DISCONTINUED | OUTPATIENT
Start: 2024-08-11 | End: 2024-08-12 | Stop reason: HOSPADM

## 2024-08-11 RX ORDER — BISACODYL 10 MG/1
10 SUPPOSITORY RECTAL DAILY PRN
Status: DISCONTINUED | OUTPATIENT
Start: 2024-08-11 | End: 2024-08-12 | Stop reason: HOSPADM

## 2024-08-11 RX ORDER — DIPHENHYDRAMINE HYDROCHLORIDE 50 MG/ML
25 INJECTION INTRAMUSCULAR; INTRAVENOUS EVERY 4 HOURS PRN
Status: DISCONTINUED | OUTPATIENT
Start: 2024-08-11 | End: 2024-08-12 | Stop reason: HOSPADM

## 2024-08-11 RX ORDER — OXYTOCIN-SODIUM CHLORIDE 0.9% IV SOLN 30 UNIT/500ML 30-0.9/5 UT/ML-%
10 SOLUTION INTRAVENOUS ONCE AS NEEDED
Status: DISCONTINUED | OUTPATIENT
Start: 2024-08-11 | End: 2024-08-12 | Stop reason: HOSPADM

## 2024-08-11 RX ORDER — HYDROCORTISONE 25 MG/G
CREAM TOPICAL 3 TIMES DAILY PRN
Status: DISCONTINUED | OUTPATIENT
Start: 2024-08-11 | End: 2024-08-12 | Stop reason: HOSPADM

## 2024-08-11 RX ORDER — MISOPROSTOL 100 UG/1
800 TABLET ORAL ONCE AS NEEDED
Status: DISCONTINUED | OUTPATIENT
Start: 2024-08-11 | End: 2024-08-12 | Stop reason: HOSPADM

## 2024-08-11 RX ORDER — DIPHENHYDRAMINE HYDROCHLORIDE 50 MG/ML
12.5 INJECTION INTRAMUSCULAR; INTRAVENOUS EVERY 4 HOURS PRN
Status: DISCONTINUED | OUTPATIENT
Start: 2024-08-11 | End: 2024-08-12 | Stop reason: HOSPADM

## 2024-08-11 RX ORDER — OXYCODONE HYDROCHLORIDE 5 MG/1
5 TABLET ORAL EVERY 4 HOURS PRN
Status: DISCONTINUED | OUTPATIENT
Start: 2024-08-11 | End: 2024-08-12 | Stop reason: HOSPADM

## 2024-08-11 RX ORDER — ONDANSETRON HYDROCHLORIDE 2 MG/ML
4 INJECTION, SOLUTION INTRAVENOUS ONCE
Status: DISCONTINUED | OUTPATIENT
Start: 2024-08-11 | End: 2024-08-12 | Stop reason: HOSPADM

## 2024-08-11 RX ORDER — PRENATAL WITH FERROUS FUM AND FOLIC ACID 3080; 920; 120; 400; 22; 1.84; 3; 20; 10; 1; 12; 200; 27; 25; 2 [IU]/1; [IU]/1; MG/1; [IU]/1; MG/1; MG/1; MG/1; MG/1; MG/1; MG/1; UG/1; MG/1; MG/1; MG/1; MG/1
1 TABLET ORAL DAILY
Status: DISCONTINUED | OUTPATIENT
Start: 2024-08-11 | End: 2024-08-12 | Stop reason: HOSPADM

## 2024-08-11 RX ORDER — SIMETHICONE 80 MG
1 TABLET,CHEWABLE ORAL EVERY 6 HOURS PRN
Status: DISCONTINUED | OUTPATIENT
Start: 2024-08-11 | End: 2024-08-12 | Stop reason: HOSPADM

## 2024-08-11 RX ORDER — ALUMINUM HYDROXIDE, MAGNESIUM HYDROXIDE, AND SIMETHICONE 1200; 120; 1200 MG/30ML; MG/30ML; MG/30ML
30 SUSPENSION ORAL EVERY 6 HOURS PRN
Status: DISCONTINUED | OUTPATIENT
Start: 2024-08-11 | End: 2024-08-12 | Stop reason: HOSPADM

## 2024-08-11 RX ORDER — OXYTOCIN-SODIUM CHLORIDE 0.9% IV SOLN 30 UNIT/500ML 30-0.9/5 UT/ML-%
95 SOLUTION INTRAVENOUS ONCE
Status: DISCONTINUED | OUTPATIENT
Start: 2024-08-11 | End: 2024-08-12 | Stop reason: HOSPADM

## 2024-08-11 RX ORDER — DIPHENHYDRAMINE HCL 25 MG
25 CAPSULE ORAL EVERY 4 HOURS PRN
Status: DISCONTINUED | OUTPATIENT
Start: 2024-08-11 | End: 2024-08-12 | Stop reason: HOSPADM

## 2024-08-11 RX ORDER — ONDANSETRON 4 MG/1
8 TABLET, ORALLY DISINTEGRATING ORAL EVERY 8 HOURS PRN
Status: DISCONTINUED | OUTPATIENT
Start: 2024-08-11 | End: 2024-08-12 | Stop reason: HOSPADM

## 2024-08-11 RX ORDER — ADHESIVE BANDAGE
30 BANDAGE TOPICAL NIGHTLY PRN
Status: DISCONTINUED | OUTPATIENT
Start: 2024-08-11 | End: 2024-08-12 | Stop reason: HOSPADM

## 2024-08-11 RX ORDER — DIPHENOXYLATE HYDROCHLORIDE AND ATROPINE SULFATE 2.5; .025 MG/1; MG/1
2 TABLET ORAL EVERY 6 HOURS PRN
Status: DISCONTINUED | OUTPATIENT
Start: 2024-08-11 | End: 2024-08-12 | Stop reason: HOSPADM

## 2024-08-11 RX ORDER — DOCUSATE SODIUM 100 MG/1
200 CAPSULE, LIQUID FILLED ORAL 2 TIMES DAILY
Status: DISCONTINUED | OUTPATIENT
Start: 2024-08-11 | End: 2024-08-12 | Stop reason: HOSPADM

## 2024-08-11 RX ORDER — CARBOPROST TROMETHAMINE 250 UG/ML
250 INJECTION, SOLUTION INTRAMUSCULAR
Status: DISCONTINUED | OUTPATIENT
Start: 2024-08-11 | End: 2024-08-12 | Stop reason: HOSPADM

## 2024-08-11 RX ORDER — OXYTOCIN-SODIUM CHLORIDE 0.9% IV SOLN 30 UNIT/500ML 30-0.9/5 UT/ML-%
95 SOLUTION INTRAVENOUS ONCE AS NEEDED
Status: DISCONTINUED | OUTPATIENT
Start: 2024-08-11 | End: 2024-08-12 | Stop reason: HOSPADM

## 2024-08-11 RX ADMIN — IBUPROFEN 600 MG: 600 TABLET, FILM COATED ORAL at 04:08

## 2024-08-11 RX ADMIN — Medication 2 MILLI-UNITS/MIN: at 12:08

## 2024-08-11 RX ADMIN — OXYTOCIN-SODIUM CHLORIDE 0.9% IV SOLN 30 UNIT/500ML 334 MILLI-UNITS/MIN: 30-0.9/5 SOLUTION at 02:08

## 2024-08-11 RX ADMIN — PRENATAL VITAMINS-IRON FUMARATE 27 MG IRON-FOLIC ACID 0.8 MG TABLET 1 TABLET: at 09:08

## 2024-08-11 RX ADMIN — Medication: at 04:08

## 2024-08-11 RX ADMIN — DOCUSATE SODIUM 200 MG: 100 CAPSULE, LIQUID FILLED ORAL at 09:08

## 2024-08-11 RX ADMIN — ACETAMINOPHEN 1000 MG: 500 TABLET ORAL at 05:08

## 2024-08-11 RX ADMIN — ACETAMINOPHEN 1000 MG: 500 TABLET ORAL at 12:08

## 2024-08-11 RX ADMIN — IBUPROFEN 600 MG: 600 TABLET, FILM COATED ORAL at 10:08

## 2024-08-11 RX ADMIN — ACETAMINOPHEN 1000 MG: 500 TABLET ORAL at 06:08

## 2024-08-11 RX ADMIN — IBUPROFEN 600 MG: 600 TABLET, FILM COATED ORAL at 09:08

## 2024-08-11 RX ADMIN — OXYCODONE HYDROCHLORIDE 5 MG: 5 TABLET ORAL at 09:08

## 2024-08-11 NOTE — ANESTHESIA PREPROCEDURE EVALUATION
08/10/2024  Demond Phipps is a 23 y.o., female.      Pre-op Assessment    I have reviewed the Patient Summary Reports.     I have reviewed the Nursing Notes. I have reviewed the NPO Status.   I have reviewed the Medications.     Review of Systems      Physical Exam  General: Well nourished, Cooperative, Alert and Oriented    Airway:  Mallampati: II   Mouth Opening: Normal  TM Distance: Normal  Tongue: Normal  Neck ROM: Normal ROM    Dental:  Intact        Anesthesia Plan  Type of Anesthesia, risks & benefits discussed:    Anesthesia Type: Epidural  Intra-op Monitoring Plan: Standard ASA Monitors  Post Op Pain Control Plan: multimodal analgesia, epidural analgesia and PCA  Informed Consent: Informed consent signed with the Patient and all parties understand the risks and agree with anesthesia plan.  All questions answered.   ASA Score: 2    Ready For Surgery From Anesthesia Perspective.     .

## 2024-08-11 NOTE — PLAN OF CARE
Problem:  Fall Injury Risk  Goal: Absence of Fall, Infant Drop and Related Injury  Outcome: Progressing     Problem: Infection  Goal: Absence of Infection Signs and Symptoms  Outcome: Progressing     Problem: Adult Inpatient Plan of Care  Goal: Plan of Care Review  Outcome: Progressing  Goal: Patient-Specific Goal (Individualized)  Outcome: Progressing  Flowsheets (Taken 2024 0550)  Patient/Family-Specific Goals (Include Timeframe): pain control  Goal: Absence of Hospital-Acquired Illness or Injury  Outcome: Progressing  Goal: Optimal Comfort and Wellbeing  Outcome: Progressing  Goal: Readiness for Transition of Care  Outcome: Progressing

## 2024-08-11 NOTE — ANESTHESIA POSTPROCEDURE EVALUATION
Anesthesia Post Evaluation    Patient: Demond Phipps    Procedure(s) Performed: * No procedures listed *    Final Anesthesia Type: regional (Regional comprises either epidural or spinal)      Patient location during evaluation: labor & delivery  Patient participation: Yes- Able to Participate  Post-procedure vital signs: reviewed and stable (No complaints at this time)  Pain management: adequate      Anesthetic complications: no                No complaints at this time.      Vitals Value Taken Time   /74 08/11/24 0448   Temp 36.8 °C (98.2 °F) 08/11/24 0448   Pulse 90 08/11/24 0413   Resp 16 08/11/24 0327   SpO2 100 % 08/11/24 0411         No case tracking events are documented in the log.      Pain/Young Score: Pain Rating Prior to Med Admin: 0 (8/11/2024  4:26 AM)

## 2024-08-11 NOTE — PROGRESS NOTES
"L&D progress note    In for decel     /77   Pulse 89   Temp 98.4 °F (36.9 °C)   Resp 18   Ht 5' 5" (1.651 m)   Wt 90.3 kg (199 lb)   LMP  (LMP Unknown)   SpO2 99%   BMI 33.12 kg/m²     NAD  Well perfused  Nonlabored breathing  NT ND  Calves nontender  SVE 5/70/-2, AROM with CF     EFM reactive, had a 3-4 min decel     A/P:  Augment for advanced cervical dilation in labor  Start pit PRN    Tate Stern MD  08/10/2024     "

## 2024-08-11 NOTE — ANESTHESIA PROCEDURE NOTES
Epidural    Patient location during procedure: OB   Reason for block: primary anesthetic   Reason for block: labor analgesia requested by patient and obstetrician  Diagnosis: Labor Pain   Start time: 8/10/2024 9:53 PM  Timeout: 8/10/2024 9:52 PM  End time: 8/10/2024 10:01 PM  Surgery related to: Active Labor    Staffing  Performing Provider: Chucho Harris CRNA  Authorizing Provider: Amadeo Thomas MD    Staffing  Performed by: Chucho Harris CRNA  Authorized by: Amadeo Thomas MD        Preanesthetic Checklist  Completed: patient identified, IV checked, site marked, risks and benefits discussed, surgical consent, monitors and equipment checked, pre-op evaluation, timeout performed, anesthesia consent given, hand hygiene performed and patient being monitored  Preparation  Patient position: sitting  Prep: ChloraPrep  Patient monitoring: Pulse Ox and Blood Pressure  Reason for block: primary anesthetic   Epidural  Skin Anesthetic: lidocaine 1%  Skin Wheal: 3 mL  Administration type: continuous  Approach: midline  Interspace: L3-4    Injection technique: WIL saline  Needle and Epidural Catheter  Needle type: Tuohy   Needle gauge: 17  Needle length: 7.0 inches  Needle insertion depth: 5 cm  Catheter type: multi-orifice  Catheter size: 18 G  Catheter at skin depth: 12 cm  Insertion Attempts: 1  Test dose: 3 mL of lidocaine 1.5% with Epi 1-to-200,000  Additional Documentation: incremental injection, no paresthesia on injection, no significant pain on injection, negative aspiration for heme and CSF and no significant complaints from patient  Needle localization: anatomical landmarks  Assessment  Upper dermatomal levels - Left: T10  Right: T10   Dermatomal levels determined by alcohol wipe  Ease of block: easy  Patient's tolerance of the procedure: comfortable throughout block and no complaints  Additional Notes  States comfortable No inadvertent dural puncture with Tuohy.  Dural puncture not performed with  spinal needle

## 2024-08-11 NOTE — L&D DELIVERY NOTE
Physician Delivery Note    23 y.o. year old  at 37w0d    Pregnancy Complication:    Patient Active Problem List    Diagnosis Date Noted     uterine contractions, antepartum, third trimester 08/10/2024    36 weeks gestation of pregnancy 08/10/2024    Anemia affecting pregnancy in third trimester 2024    Depression during pregnancy in third trimester 04/10/2024    Anxiety 04/10/2024    Prior pregnancy with fetal demise and current pregnancy in third trimester 2024    Hx of intrauterine growth restriction in prior pregnancy, currently pregnant 2024    Herpes simplex virus type 2 (HSV-2) infection affecting pregnancy in third trimester 2024    At high risk for complications of intrauterine pregnancy (IUP) 2024    Increased BMI affecting pregnancy in third trimester 2024       Labor:  augemented    Anesthesia:  epidural    Episiotomy/Laceration/Repair:  1st degree hemostatic without repair    Delivery:       Position:  OA    Placenta:  spontaneous    Complications:  none    Comments:    Baby OA. Anterior and posterior shoulders atraumatically delivered with gentle traction followed by the trunk and LE. Infant vigorous with spontaneous cry. Baby placed on mother's chest for skin to skin contact. Delayed cord clamping was performed for 60 seconds. Cord doubly clamped and cut.  Cord blood collected. Placenta delivered by controlled cord traction intact. Normal uterine tone with oxytocin infusion.  Rectum and vagina clear of sponges. Placenta discarded.  and patient stable in the delivery room with nursing present.    Tate Stern MD  2024 2:52 AM

## 2024-08-11 NOTE — PLAN OF CARE
Problem:  Fall Injury Risk  Goal: Absence of Fall, Infant Drop and Related Injury  Outcome: Progressing     Problem: Infection  Goal: Absence of Infection Signs and Symptoms  Outcome: Progressing     Problem: Adult Inpatient Plan of Care  Goal: Plan of Care Review  Outcome: Progressing  Goal: Patient-Specific Goal (Individualized)  Outcome: Progressing  Goal: Absence of Hospital-Acquired Illness or Injury  Outcome: Progressing  Goal: Optimal Comfort and Wellbeing  Outcome: Progressing  Goal: Readiness for Transition of Care  Outcome: Progressing

## 2024-08-12 VITALS
SYSTOLIC BLOOD PRESSURE: 106 MMHG | DIASTOLIC BLOOD PRESSURE: 72 MMHG | HEART RATE: 76 BPM | HEIGHT: 65 IN | TEMPERATURE: 98 F | BODY MASS INDEX: 33.15 KG/M2 | OXYGEN SATURATION: 98 % | RESPIRATION RATE: 17 BRPM | WEIGHT: 199 LBS

## 2024-08-12 LAB — STREP B PCR (OHS): DETECTED

## 2024-08-12 PROCEDURE — 25000003 PHARM REV CODE 250: Performed by: STUDENT IN AN ORGANIZED HEALTH CARE EDUCATION/TRAINING PROGRAM

## 2024-08-12 RX ORDER — OXYCODONE HYDROCHLORIDE 5 MG/1
5 TABLET ORAL EVERY 6 HOURS PRN
Qty: 20 TABLET | Refills: 0 | Status: SHIPPED | OUTPATIENT
Start: 2024-08-12

## 2024-08-12 RX ADMIN — IBUPROFEN 600 MG: 600 TABLET, FILM COATED ORAL at 10:08

## 2024-08-12 RX ADMIN — PRENATAL VITAMINS-IRON FUMARATE 27 MG IRON-FOLIC ACID 0.8 MG TABLET 1 TABLET: at 08:08

## 2024-08-12 RX ADMIN — DOCUSATE SODIUM 200 MG: 100 CAPSULE, LIQUID FILLED ORAL at 08:08

## 2024-08-12 RX ADMIN — ACETAMINOPHEN 1000 MG: 500 TABLET ORAL at 01:08

## 2024-08-12 RX ADMIN — ACETAMINOPHEN 1000 MG: 500 TABLET ORAL at 06:08

## 2024-08-12 RX ADMIN — IBUPROFEN 600 MG: 600 TABLET, FILM COATED ORAL at 04:08

## 2024-08-12 RX ADMIN — ACETAMINOPHEN 1000 MG: 500 TABLET ORAL at 11:08

## 2024-08-12 NOTE — PLAN OF CARE
Problem:  Fall Injury Risk  Goal: Absence of Fall, Infant Drop and Related Injury  Outcome: Progressing     Problem: Infection  Goal: Absence of Infection Signs and Symptoms  Outcome: Progressing     Problem: Adult Inpatient Plan of Care  Goal: Plan of Care Review  Outcome: Progressing  Goal: Patient-Specific Goal (Individualized)  Outcome: Progressing  Goal: Absence of Hospital-Acquired Illness or Injury  Outcome: Progressing  Goal: Optimal Comfort and Wellbeing  Outcome: Progressing  Goal: Readiness for Transition of Care  Outcome: Progressing     Problem: Postpartum (Vaginal Delivery)  Goal: Successful Parent Role Transition  Outcome: Progressing  Goal: Hemostasis  Outcome: Progressing  Goal: Absence of Infection Signs and Symptoms  Outcome: Progressing  Goal: Anesthesia/Sedation Recovery  Outcome: Progressing  Goal: Optimal Pain Control and Function  Outcome: Progressing  Goal: Effective Urinary Elimination  Outcome: Progressing     Problem: Breastfeeding  Goal: Effective Breastfeeding  Outcome: Progressing

## 2024-08-13 LAB
RUBV IGG SERPL IA-ACNC: 1.7
RUBV IGG SERPL QL IA: POSITIVE

## 2024-08-14 NOTE — DISCHARGE SUMMARY
[facility]                       [address] [place] [id number]    PATIENT NAME:       VIDA CYR  YOB: 2000  CSN:                [id number]   MRN:                [id number]  ADMIT DATE:         08/10/2024 13:10:00  PHYSICIAN:          Donte Neely Jr, MD                          DISCHARGE SUMMARY    DATE OF DISCHARGE:  08/12/2024 02:43:00    HOSPITAL COURSE:  The patient is status post vaginal without incident.  She was   admitted to the postpartal GYN service where she had an uneventful and speedy   recovery.  She was ambulatory, tolerating regular diet.  Vitals are stable.  She   was afebrile.  She had normal bowel and bladder function.  She was discharged   home on postpartum day #2.    CONDITION ON DISCHARGE:  Stable.    DIET:  Regular.    ACTIVITY:  Pelvic rest.    MEDICATIONS:    1. Percocet p.r.n.  2. Motrin p.r.n.    FOLLOWUP:  Follow up with Dr. Neely in 3 weeks.        ______________________________  Donte Neely Jr, MD    DJE/AQS  DD:  08/13/2024  Time:  12:16PM  DT:  08/13/2024  Time:  10:16PM  Job #:  [id number]      DISCHARGE SUMMARY

## 2024-09-01 ENCOUNTER — PATIENT MESSAGE (OUTPATIENT)
Dept: MATERNAL FETAL MEDICINE | Facility: CLINIC | Age: 24
End: 2024-09-01
Payer: MEDICAID